# Patient Record
Sex: FEMALE | Race: WHITE | Employment: OTHER | URBAN - METROPOLITAN AREA
[De-identification: names, ages, dates, MRNs, and addresses within clinical notes are randomized per-mention and may not be internally consistent; named-entity substitution may affect disease eponyms.]

---

## 2017-01-11 ENCOUNTER — HOSPITAL ENCOUNTER (OUTPATIENT)
Dept: MAMMOGRAPHY | Age: 77
Discharge: HOME | End: 2017-01-11
Attending: INTERNAL MEDICINE
Payer: MEDICARE

## 2017-01-11 DIAGNOSIS — Z12.31 VISIT FOR SCREENING MAMMOGRAM: ICD-10-CM

## 2017-01-11 PROCEDURE — 77063 BREAST TOMOSYNTHESIS BI: CPT

## 2017-01-25 ENCOUNTER — HOSPITAL ENCOUNTER (OUTPATIENT)
Dept: MAMMOGRAPHY | Age: 77
Discharge: HOME | End: 2017-01-25
Attending: INTERNAL MEDICINE
Payer: MEDICARE

## 2017-01-25 ENCOUNTER — HOSPITAL ENCOUNTER (OUTPATIENT)
Dept: ULTRASOUND IMAGING | Age: 77
Discharge: HOME | End: 2017-01-25
Attending: INTERNAL MEDICINE
Payer: MEDICARE

## 2017-01-25 DIAGNOSIS — R92.8 ABNORMAL MAMMOGRAM: ICD-10-CM

## 2017-01-25 DIAGNOSIS — N64.89 BREAST ASYMMETRY: ICD-10-CM

## 2017-01-25 PROCEDURE — 77065 DX MAMMO INCL CAD UNI: CPT

## 2017-01-25 PROCEDURE — 76642 ULTRASOUND BREAST LIMITED: CPT

## 2017-01-31 ENCOUNTER — HOSPITAL ENCOUNTER (OUTPATIENT)
Dept: MAMMOGRAPHY | Age: 77
Discharge: HOME | End: 2017-01-31
Attending: INTERNAL MEDICINE
Payer: MEDICARE

## 2017-01-31 DIAGNOSIS — N64.89 BREAST ASYMMETRY: ICD-10-CM

## 2017-01-31 DIAGNOSIS — R92.8 ABNORMAL MAMMOGRAM: ICD-10-CM

## 2017-01-31 PROCEDURE — 88374 M/PHMTRC ALYS ISHQUANT/SEMIQ: CPT | Performed by: INTERNAL MEDICINE

## 2017-01-31 PROCEDURE — 88305 TISSUE EXAM BY PATHOLOGIST: CPT | Performed by: INTERNAL MEDICINE

## 2017-01-31 PROCEDURE — 88360 TUMOR IMMUNOHISTOCHEM/MANUAL: CPT | Performed by: INTERNAL MEDICINE

## 2017-01-31 PROCEDURE — 77065 DX MAMMO INCL CAD UNI: CPT

## 2017-01-31 PROCEDURE — 74011000250 HC RX REV CODE- 250: Performed by: INTERNAL MEDICINE

## 2017-01-31 PROCEDURE — 19081 BX BREAST 1ST LESION STRTCTC: CPT

## 2017-01-31 RX ORDER — LIDOCAINE HYDROCHLORIDE AND EPINEPHRINE 10; 10 MG/ML; UG/ML
1.5 INJECTION, SOLUTION INFILTRATION; PERINEURAL
Status: COMPLETED | OUTPATIENT
Start: 2017-01-31 | End: 2017-01-31

## 2017-01-31 RX ORDER — LIDOCAINE HYDROCHLORIDE 10 MG/ML
5 INJECTION, SOLUTION EPIDURAL; INFILTRATION; INTRACAUDAL; PERINEURAL
Status: COMPLETED | OUTPATIENT
Start: 2017-01-31 | End: 2017-01-31

## 2017-01-31 RX ADMIN — LIDOCAINE HYDROCHLORIDE AND EPINEPHRINE 30 MG: 10; 10 INJECTION, SOLUTION INFILTRATION; PERINEURAL at 08:45

## 2017-01-31 RX ADMIN — LIDOCAINE HYDROCHLORIDE 3 ML: 10 INJECTION, SOLUTION EPIDURAL; INFILTRATION; INTRACAUDAL; PERINEURAL at 08:45

## 2017-02-06 ENCOUNTER — OFFICE VISIT (OUTPATIENT)
Dept: SURGERY | Age: 77
End: 2017-02-06

## 2017-02-06 VITALS — WEIGHT: 112 LBS | RESPIRATION RATE: 20 BRPM | BODY MASS INDEX: 21.99 KG/M2 | HEIGHT: 60 IN

## 2017-02-06 DIAGNOSIS — Z01.818 PRE-OP TESTING: Primary | ICD-10-CM

## 2017-02-06 DIAGNOSIS — C50.012 MALIGNANT NEOPLASM OF NIPPLE OF LEFT BREAST IN FEMALE (HCC): ICD-10-CM

## 2017-02-06 PROBLEM — C50.412 BREAST CANCER OF UPPER-OUTER QUADRANT OF LEFT FEMALE BREAST (HCC): Status: ACTIVE | Noted: 2017-02-06

## 2017-02-06 RX ORDER — LEVOTHYROXINE SODIUM 25 UG/1
TABLET ORAL
Refills: 2 | COMMUNITY
Start: 2016-11-23

## 2017-02-06 RX ORDER — ALPRAZOLAM 0.5 MG/1
TABLET ORAL
Refills: 0 | COMMUNITY
Start: 2017-01-20

## 2017-02-06 RX ORDER — ATORVASTATIN CALCIUM 40 MG/1
TABLET, FILM COATED ORAL
Refills: 2 | COMMUNITY
Start: 2017-01-08

## 2017-02-06 RX ORDER — CLOPIDOGREL BISULFATE 75 MG/1
TABLET ORAL
Refills: 2 | COMMUNITY
Start: 2017-01-30

## 2017-02-06 RX ORDER — AMLODIPINE BESYLATE 2.5 MG/1
TABLET ORAL
Refills: 3 | Status: ON HOLD | COMMUNITY
Start: 2016-12-01 | End: 2018-11-08

## 2017-02-06 NOTE — PROGRESS NOTES
Progress Note    Patient: Mary Lima  MRN: [id number]  SSN: [id number]      1   YOB: 1940  Age: 78 y.o. Sex: female     Chief Complaint   Patient presents with    Breast Cancer     left breast cancer; on Plavix for about 7yr after TIA        HPI    Ms. Roger Luke is a 72-year-old woman who presents after a biopsy by radiology shows infiltrating ductal carcinoma in her left breast.  She has a 6.6 mm lesion in the upper outer quadrant of her left breast detected by screening mammography. She has some distant cousins that have had breast cancer but no primary relatives. Her menarche was 17 and her first child in her early 25s. She has no other risk factors for breast cancer. This is an asymptomatic lesion. No past medical history on file. Past Surgical History   Procedure Laterality Date    Hx hysterectomy       No Known Allergies  Current Outpatient Prescriptions   Medication Sig Dispense Refill    ALPRAZolam (XANAX) 0.5 mg tablet TK 1 T PO ONCE A DAY FOR 30 DAYS  0    amLODIPine (NORVASC) 2.5 mg tablet TK 1 T PO  ONCE A DAY  3    atorvastatin (LIPITOR) 40 mg tablet TK 1 T PO  ONCE A DAY  2    levothyroxine (SYNTHROID) 25 mcg tablet TK 1 T PO QD IN THE MORNING OES  2    clopidogrel (PLAVIX) 75 mg tab TK 1 T PO QD  2     Social History     Social History    Marital status:      Spouse name: N/A    Number of children: N/A    Years of education: N/A     Occupational History    Not on file.      Social History Main Topics    Smoking status: Not on file    Smokeless tobacco: Not on file    Alcohol use Not on file    Drug use: Not on file    Sexual activity: Not on file     Other Topics Concern    Not on file     Social History Narrative    No narrative on file     Family History   Problem Relation Age of Onset    Cancer Mother 39     Throat         Review of systems:  Patient denies any reflux, emesis, abdominal pain, change in bowel habits, hematochezia, melena, fever, weight loss, fatigue chills, dermatitis, abnormal moles, change in vision, vertigo, epistaxis, dysphagia, hoarseness, chest pain, palpitations, hypertension, edema, cough, shortness of breath, wheezing, hemoptysis, snoring, hematuria, diabetes, thyroid disease, anemia, bruising, history of blood transfusion, dizziness, headache, or fainting. Physical Examination    Well developed well nourished female in no apparent distress  Visit Vitals    Resp 20    Ht 4' 11.5\" (1.511 m)    Wt 50.8 kg (112 lb)    BMI 22.24 kg/m2      Head: normocephalic, atraumatic  Mouth: Clear, no overt lesions, oral mucosa pink and moist  Neck: supple, no masses, no adenopathy or carotid bruits, trachea midline  Resp: clear to auscultation bilaterally, no wheeze, rhonchi or rales, excursions normal and symmetrical  Cardio: Regular rate and rhythm, no murmurs, clicks, gallops or rubs, no edema or varicosities  Abdomen: soft, nontender, nondistended, normoactive bowel sounds, no hernias, no hepatosplenomegaly,   Back: Deferred  Extremeties: warm, well-perfused, no tenderness or swelling, normal gait/station  Neuro: sensation and strength grossly intact and symmetrical  Psych: alert and oriented to person, place and time  Breast exam bilateral breast exam without dominant mass, skin change, nipple discharge, or lymphadenopathy. Contused left breast at biopsy site. IMPRESSION  Left breast cancer. We have talked in detail for greater than an hour about the treatment of breast cancer to include surgical options radiation, chemotherapy, and hormone therapy. We reviewed data from the NCCN guidelines as well as survival rates for both breast conservation and mastectomy. We have discussed sentinel lymph node biopsy as well. She is decided for a left mastectomy with sentinel lymph node biopsy.     PLAN  Orders Placed This Encounter    NM LYMPHOSCINTIG LT BREAST     Standing Status:   Future     Standing Expiration Date:   3/6/2018 Scheduling Instructions:      Per Dilcia in nuc med, schedule for 7:30a     Order Specific Question:   Reason for Exam     Answer:   left breast cancer    NM LYMPHOSCINTIG LT BREAST     Standing Status:   Future     Standing Expiration Date:   3/6/2018     Scheduling Instructions:      Per Dilcia in nuc med, please schedule for 7:30a     Order Specific Question:   Reason for Exam     Answer:   left breast cancer    ALPRAZolam (XANAX) 0.5 mg tablet     Sig: TK 1 T PO ONCE A DAY FOR 30 DAYS     Refill:  0    amLODIPine (NORVASC) 2.5 mg tablet     Sig: TK 1 T PO  ONCE A DAY     Refill:  3    atorvastatin (LIPITOR) 40 mg tablet     Sig: TK 1 T PO  ONCE A DAY     Refill:  2    clopidogrel (PLAVIX) 75 mg tab     Sig: TK 1 T PO QD     Refill:  2    levothyroxine (SYNTHROID) 25 mcg tablet     Sig: TK 1 T PO QD IN THE MORNING OES     Refill:  2     Left total mastectomy with sentinel lymph node biopsy after Plavix has been held for 2 weeks.   David Jaquez MD

## 2017-02-06 NOTE — MR AVS SNAPSHOT
Visit Information Date & Time Provider Department Dept. Phone Encounter #  
 2/6/2017  9:30 AM MD [facility] [phone] [id number] Upcoming Health Maintenance Date Due DTaP/Tdap/Td series (1 - Tdap) 7/20/1963 FOBT Q 1 YEAR AGE 50-75 7/20/1992 ZOSTER VACCINE AGE 60> 7/20/2002 GLAUCOMA SCREENING Q2Y 7/20/2007 OSTEOPOROSIS SCREENING (DEXA) 7/20/2007 Pneumococcal 65+ Low/Medium Risk (1 of 2 - PCV13) 7/20/2007 MEDICARE YEARLY EXAM 7/20/2007 INFLUENZA AGE 9 TO ADULT 8/1/2016 Allergies as of 2/6/2017  Review Complete On: 2/6/2017 By: Krista Man LPN No Known Allergies Current Immunizations  Never Reviewed No immunizations on file. Not reviewed this visit Vitals Resp Height(growth percentile) Weight(growth percentile) BMI  
  
 20 4' 11.5\" (1.511 m) 112 lb (50.8 kg) 22.24 kg/m2 BMI and BSA Data Body Mass Index Body Surface Area  
 22.24 kg/m 2 1.46 m 2 Your Updated Medication List  
  
   
This list is accurate as of: 2/6/17 10:20 AM.  Always use your most recent med list.  
  
  
  
  
 ALPRAZolam 0.5 mg tablet Commonly known as:  Sharyon Kida TK 1 T PO ONCE A DAY FOR 30 DAYS  
  
 amLODIPine 2.5 mg tablet Commonly known as:  Brandy Fraction TK 1 T PO  ONCE A DAY  
  
 atorvastatin 40 mg tablet Commonly known as:  LIPITOR TK 1 T PO  ONCE A DAY  
  
 clopidogrel 75 mg Tab Commonly known as:  PLAVIX TK 1 T PO QD  
  
 levothyroxine 25 mcg tablet Commonly known as:  SYNTHROID TK 1 T PO QD IN THE MORNING OES Patient Instructions Call the office at [phone] if you have any questions or concerns. Introducing [facility] & HEALTH SERVICES! Karlos Hernandez introduces Picturk patient portal. Now you can access parts of your medical record, email your doctor's office, and request medication refills online.    
 
1. In your internet browser, go to [email or web address] 2. Click on the First Time User? Click Here link in the Sign In box. You will see the New Member Sign Up page. 3. Enter your [employer] Access Code exactly as it appears below. You will not need to use this code after youve completed the sign-up process. If you do not sign up before the expiration date, you must request a new code. · [employer] Access Code: [email or web address] Expires: 4/3/2017  3:23 PM 
 
4. Enter the last four digits of your Social Security Number (xxxx) and Date of Birth (mm/dd/yyyy) as indicated and click Submit. You will be taken to the next sign-up page. 5. Create a Popular Payst ID. This will be your [employer] login ID and cannot be changed, so think of one that is secure and easy to remember. 6. Create a [employer] password. You can change your password at any time. 7. Enter your Password Reset Question and Answer. This can be used at a later time if you forget your password. 8. Enter your e-mail address. You will receive e-mail notification when new information is available in [address] 9. Click Sign Up. You can now view and download portions of your medical record. 10. Click the Download Summary menu link to download a portable copy of your medical information. If you have questions, please visit the Frequently Asked Questions section of the [employer] website. Remember, [employer] is NOT to be used for urgent needs. For medical emergencies, dial 911. Now available from your iPhone and Android! Please provide this summary of care documentation to your next provider. Your primary care clinician is listed as Symone Grijalva. If you have any questions after today's visit, please call [phone].

## 2017-02-08 ENCOUNTER — HOSPITAL ENCOUNTER (OUTPATIENT)
Dept: LAB | Age: 77
Discharge: HOME | End: 2017-02-08
Payer: MEDICARE

## 2017-02-08 DIAGNOSIS — Z01.818 PRE-OP TESTING: ICD-10-CM

## 2017-02-08 LAB
ANION GAP BLD CALC-SCNC: 8 MMOL/L (ref 3–18)
ATRIAL RATE: 60 BPM
BASOPHILS # BLD AUTO: 0 K/UL (ref 0–0.1)
BASOPHILS # BLD: 1 % (ref 0–2)
BUN SERPL-MCNC: 21 MG/DL (ref 7–18)
BUN/CREAT SERPL: 26 (ref 12–20)
CALCIUM SERPL-MCNC: 9.6 MG/DL (ref 8.5–10.1)
CALCULATED P AXIS, ECG09: 15 DEGREES
CALCULATED R AXIS, ECG10: 42 DEGREES
CALCULATED T AXIS, ECG11: 6 DEGREES
CHLORIDE SERPL-SCNC: 102 MMOL/L (ref 100–108)
CO2 SERPL-SCNC: 30 MMOL/L (ref 21–32)
CREAT SERPL-MCNC: 0.82 MG/DL (ref 0.6–1.3)
DIAGNOSIS, 93000: NORMAL
DIFFERENTIAL METHOD BLD: ABNORMAL
EOSINOPHIL # BLD: 0.1 K/UL (ref 0–0.4)
EOSINOPHIL NFR BLD: 3 % (ref 0–5)
ERYTHROCYTE [DISTWIDTH] IN BLOOD BY AUTOMATED COUNT: 12.6 % (ref 11.6–14.5)
GLUCOSE SERPL-MCNC: 105 MG/DL (ref 74–99)
HCT VFR BLD AUTO: 40.5 % (ref 35–45)
HGB BLD-MCNC: 13.7 G/DL (ref 12–16)
LYMPHOCYTES # BLD AUTO: 27 % (ref 21–52)
LYMPHOCYTES # BLD: 1.1 K/UL (ref 0.9–3.6)
MCH RBC QN AUTO: 32.5 PG (ref 24–34)
MCHC RBC AUTO-ENTMCNC: 33.8 G/DL (ref 31–37)
MCV RBC AUTO: 96 FL (ref 74–97)
MONOCYTES # BLD: 0.6 K/UL (ref 0.05–1.2)
MONOCYTES NFR BLD AUTO: 14 % (ref 3–10)
NEUTS SEG # BLD: 2.3 K/UL (ref 1.8–8)
NEUTS SEG NFR BLD AUTO: 55 % (ref 40–73)
P-R INTERVAL, ECG05: 158 MS
PLATELET # BLD AUTO: 242 K/UL (ref 135–420)
PMV BLD AUTO: 9.5 FL (ref 9.2–11.8)
POTASSIUM SERPL-SCNC: 4 MMOL/L (ref 3.5–5.5)
Q-T INTERVAL, ECG07: 434 MS
QRS DURATION, ECG06: 80 MS
QTC CALCULATION (BEZET), ECG08: 434 MS
RBC # BLD AUTO: 4.22 M/UL (ref 4.2–5.3)
SODIUM SERPL-SCNC: 140 MMOL/L (ref 136–145)
VENTRICULAR RATE, ECG03: 60 BPM
WBC # BLD AUTO: 4.1 K/UL (ref 4.6–13.2)

## 2017-02-08 PROCEDURE — 80048 BASIC METABOLIC PNL TOTAL CA: CPT

## 2017-02-08 PROCEDURE — 85025 COMPLETE CBC W/AUTO DIFF WBC: CPT

## 2017-02-08 PROCEDURE — 93005 ELECTROCARDIOGRAM TRACING: CPT

## 2017-02-08 RX ORDER — ASPIRIN 81 MG/1
81 TABLET ORAL DAILY
COMMUNITY

## 2017-02-20 ENCOUNTER — ANESTHESIA EVENT (OUTPATIENT)
Dept: SURGERY | Age: 77
End: 2017-02-20
Payer: MEDICARE

## 2017-02-21 ENCOUNTER — HOSPITAL ENCOUNTER (OUTPATIENT)
Dept: NUCLEAR MEDICINE | Age: 77
Discharge: HOME | End: 2017-02-21
Payer: MEDICARE

## 2017-02-21 ENCOUNTER — ANESTHESIA (OUTPATIENT)
Dept: SURGERY | Age: 77
End: 2017-02-21
Payer: MEDICARE

## 2017-02-21 ENCOUNTER — SURGERY (OUTPATIENT)
Age: 77
End: 2017-02-21

## 2017-02-21 ENCOUNTER — HOSPITAL ENCOUNTER (OUTPATIENT)
Age: 77
Setting detail: OBSERVATION
LOS: 1 days | Discharge: HOME | End: 2017-02-22
Payer: MEDICARE

## 2017-02-21 DIAGNOSIS — C50.412 BREAST CANCER OF UPPER-OUTER QUADRANT OF LEFT FEMALE BREAST (HCC): Primary | ICD-10-CM

## 2017-02-21 DIAGNOSIS — C50.012 MALIGNANT NEOPLASM OF NIPPLE OF LEFT BREAST IN FEMALE (HCC): ICD-10-CM

## 2017-02-21 LAB
BASOPHILS # BLD AUTO: 0 K/UL (ref 0–0.1)
BASOPHILS # BLD: 0 % (ref 0–2)
DIFFERENTIAL METHOD BLD: ABNORMAL
EOSINOPHIL # BLD: 0 K/UL (ref 0–0.4)
EOSINOPHIL NFR BLD: 0 % (ref 0–5)
ERYTHROCYTE [DISTWIDTH] IN BLOOD BY AUTOMATED COUNT: 12.5 % (ref 11.6–14.5)
HCT VFR BLD AUTO: 40.4 % (ref 35–45)
HGB BLD-MCNC: 13.8 G/DL (ref 12–16)
LYMPHOCYTES # BLD AUTO: 4 % (ref 21–52)
LYMPHOCYTES # BLD: 0.4 K/UL (ref 0.9–3.6)
MCH RBC QN AUTO: 32.8 PG (ref 24–34)
MCHC RBC AUTO-ENTMCNC: 34.2 G/DL (ref 31–37)
MCV RBC AUTO: 96 FL (ref 74–97)
MONOCYTES # BLD: 0.1 K/UL (ref 0.05–1.2)
MONOCYTES NFR BLD AUTO: 1 % (ref 3–10)
NEUTS SEG # BLD: 10.1 K/UL (ref 1.8–8)
NEUTS SEG NFR BLD AUTO: 95 % (ref 40–73)
PLATELET # BLD AUTO: 225 K/UL (ref 135–420)
PMV BLD AUTO: 9.4 FL (ref 9.2–11.8)
RBC # BLD AUTO: 4.21 M/UL (ref 4.2–5.3)
WBC # BLD AUTO: 10.6 K/UL (ref 4.6–13.2)

## 2017-02-21 PROCEDURE — 78195 LYMPH SYSTEM IMAGING: CPT

## 2017-02-21 PROCEDURE — 77010033678 HC OXYGEN DAILY

## 2017-02-21 PROCEDURE — 77030031139 HC SUT VCRL2 J&J -A

## 2017-02-21 PROCEDURE — 77030002933 HC SUT MCRYL J&J -A

## 2017-02-21 PROCEDURE — 77030012012 HC AIRWY OP SFT TELE -A: Performed by: NURSE ANESTHETIST, CERTIFIED REGISTERED

## 2017-02-21 PROCEDURE — 76010000153 HC OR TIME 1.5 TO 2 HR

## 2017-02-21 PROCEDURE — 88309 TISSUE EXAM BY PATHOLOGIST: CPT

## 2017-02-21 PROCEDURE — 77030012406 HC DRN WND PENRS BARD -A

## 2017-02-21 PROCEDURE — 77030011640 HC PAD GRND REM COVD -A

## 2017-02-21 PROCEDURE — 74011250636 HC RX REV CODE- 250/636

## 2017-02-21 PROCEDURE — 74011250637 HC RX REV CODE- 250/637: Performed by: NURSE ANESTHETIST, CERTIFIED REGISTERED

## 2017-02-21 PROCEDURE — 74011636320 HC RX REV CODE- 636/320

## 2017-02-21 PROCEDURE — 77030010512 HC APPL CLP LIG J&J -C

## 2017-02-21 PROCEDURE — 77030020782 HC GWN BAIR PAWS FLX 3M -B

## 2017-02-21 PROCEDURE — 77030010509 HC AIRWY LMA MSK TELE -A: Performed by: NURSE ANESTHETIST, CERTIFIED REGISTERED

## 2017-02-21 PROCEDURE — 77030002996 HC SUT SLK J&J -A

## 2017-02-21 PROCEDURE — 77030013567 HC DRN WND RESERV BARD -A

## 2017-02-21 PROCEDURE — 36415 COLL VENOUS BLD VENIPUNCTURE: CPT

## 2017-02-21 PROCEDURE — 76060000034 HC ANESTHESIA 1.5 TO 2 HR

## 2017-02-21 PROCEDURE — 74011000250 HC RX REV CODE- 250

## 2017-02-21 PROCEDURE — 77030002916 HC SUT ETHLN J&J -A

## 2017-02-21 PROCEDURE — 76210000006 HC OR PH I REC 0.5 TO 1 HR

## 2017-02-21 PROCEDURE — 88307 TISSUE EXAM BY PATHOLOGIST: CPT

## 2017-02-21 PROCEDURE — 77030008467 HC STPLR SKN COVD -B

## 2017-02-21 PROCEDURE — 77030011265 HC ELECTRD BLD HEX COVD -A

## 2017-02-21 PROCEDURE — 85025 COMPLETE CBC W/AUTO DIFF WBC: CPT

## 2017-02-21 PROCEDURE — 74011250636 HC RX REV CODE- 250/636: Performed by: NURSE ANESTHETIST, CERTIFIED REGISTERED

## 2017-02-21 PROCEDURE — 99218 HC RM OBSERVATION: CPT

## 2017-02-21 PROCEDURE — 88342 IMHCHEM/IMCYTCHM 1ST ANTB: CPT

## 2017-02-21 RX ORDER — HYDROMORPHONE HYDROCHLORIDE 1 MG/ML
INJECTION, SOLUTION INTRAMUSCULAR; INTRAVENOUS; SUBCUTANEOUS AS NEEDED
Status: DISCONTINUED | OUTPATIENT
Start: 2017-02-21 | End: 2017-02-21 | Stop reason: HOSPADM

## 2017-02-21 RX ORDER — CEFAZOLIN SODIUM 2 G/50ML
2 SOLUTION INTRAVENOUS ONCE
Status: COMPLETED | OUTPATIENT
Start: 2017-02-21 | End: 2017-02-21

## 2017-02-21 RX ORDER — LIDOCAINE HYDROCHLORIDE 10 MG/ML
INJECTION, SOLUTION EPIDURAL; INFILTRATION; INTRACAUDAL; PERINEURAL
Status: COMPLETED
Start: 2017-02-21 | End: 2017-02-21

## 2017-02-21 RX ORDER — ISOSULFAN BLUE 50 MG/5ML
INJECTION, SOLUTION SUBCUTANEOUS AS NEEDED
Status: DISCONTINUED | OUTPATIENT
Start: 2017-02-21 | End: 2017-02-21 | Stop reason: HOSPADM

## 2017-02-21 RX ORDER — EPHEDRINE SULFATE/0.9% NACL/PF 25 MG/5 ML
SYRINGE (ML) INTRAVENOUS AS NEEDED
Status: DISCONTINUED | OUTPATIENT
Start: 2017-02-21 | End: 2017-02-21 | Stop reason: HOSPADM

## 2017-02-21 RX ORDER — LIDOCAINE HYDROCHLORIDE 20 MG/ML
INJECTION, SOLUTION EPIDURAL; INFILTRATION; INTRACAUDAL; PERINEURAL AS NEEDED
Status: DISCONTINUED | OUTPATIENT
Start: 2017-02-21 | End: 2017-02-21 | Stop reason: HOSPADM

## 2017-02-21 RX ORDER — PROPOFOL 10 MG/ML
INJECTION, EMULSION INTRAVENOUS AS NEEDED
Status: DISCONTINUED | OUTPATIENT
Start: 2017-02-21 | End: 2017-02-21 | Stop reason: HOSPADM

## 2017-02-21 RX ORDER — SODIUM BICARBONATE 1 MEQ/ML
SYRINGE (ML) INTRAVENOUS
Status: DISPENSED
Start: 2017-02-21 | End: 2017-02-21

## 2017-02-21 RX ORDER — DIPHENHYDRAMINE HYDROCHLORIDE 50 MG/ML
12.5 INJECTION, SOLUTION INTRAMUSCULAR; INTRAVENOUS
Status: DISCONTINUED | OUTPATIENT
Start: 2017-02-21 | End: 2017-02-22 | Stop reason: HOSPADM

## 2017-02-21 RX ORDER — ESMOLOL HYDROCHLORIDE 10 MG/ML
INJECTION INTRAVENOUS AS NEEDED
Status: DISCONTINUED | OUTPATIENT
Start: 2017-02-21 | End: 2017-02-21 | Stop reason: HOSPADM

## 2017-02-21 RX ORDER — SODIUM CHLORIDE, SODIUM LACTATE, POTASSIUM CHLORIDE, CALCIUM CHLORIDE 600; 310; 30; 20 MG/100ML; MG/100ML; MG/100ML; MG/100ML
75 INJECTION, SOLUTION INTRAVENOUS CONTINUOUS
Status: DISCONTINUED | OUTPATIENT
Start: 2017-02-21 | End: 2017-02-21 | Stop reason: HOSPADM

## 2017-02-21 RX ORDER — FAMOTIDINE 20 MG/1
20 TABLET, FILM COATED ORAL ONCE
Status: COMPLETED | OUTPATIENT
Start: 2017-02-21 | End: 2017-02-21

## 2017-02-21 RX ORDER — DEXAMETHASONE SODIUM PHOSPHATE 4 MG/ML
INJECTION, SOLUTION INTRA-ARTICULAR; INTRALESIONAL; INTRAMUSCULAR; INTRAVENOUS; SOFT TISSUE AS NEEDED
Status: DISCONTINUED | OUTPATIENT
Start: 2017-02-21 | End: 2017-02-21 | Stop reason: HOSPADM

## 2017-02-21 RX ORDER — MORPHINE SULFATE 2 MG/ML
2 INJECTION, SOLUTION INTRAMUSCULAR; INTRAVENOUS
Status: DISCONTINUED | OUTPATIENT
Start: 2017-02-21 | End: 2017-02-21 | Stop reason: HOSPADM

## 2017-02-21 RX ORDER — FENTANYL CITRATE 50 UG/ML
INJECTION, SOLUTION INTRAMUSCULAR; INTRAVENOUS AS NEEDED
Status: DISCONTINUED | OUTPATIENT
Start: 2017-02-21 | End: 2017-02-21 | Stop reason: HOSPADM

## 2017-02-21 RX ORDER — DEXTROSE, SODIUM CHLORIDE, AND POTASSIUM CHLORIDE 5; .45; .15 G/100ML; G/100ML; G/100ML
75 INJECTION INTRAVENOUS CONTINUOUS
Status: DISCONTINUED | OUTPATIENT
Start: 2017-02-21 | End: 2017-02-22 | Stop reason: HOSPADM

## 2017-02-21 RX ORDER — ONDANSETRON 2 MG/ML
INJECTION INTRAMUSCULAR; INTRAVENOUS AS NEEDED
Status: DISCONTINUED | OUTPATIENT
Start: 2017-02-21 | End: 2017-02-21 | Stop reason: HOSPADM

## 2017-02-21 RX ORDER — SODIUM CHLORIDE, SODIUM LACTATE, POTASSIUM CHLORIDE, CALCIUM CHLORIDE 600; 310; 30; 20 MG/100ML; MG/100ML; MG/100ML; MG/100ML
50 INJECTION, SOLUTION INTRAVENOUS CONTINUOUS
Status: DISCONTINUED | OUTPATIENT
Start: 2017-02-21 | End: 2017-02-21 | Stop reason: HOSPADM

## 2017-02-21 RX ORDER — NALOXONE HYDROCHLORIDE 0.4 MG/ML
0.1 INJECTION, SOLUTION INTRAMUSCULAR; INTRAVENOUS; SUBCUTANEOUS AS NEEDED
Status: DISCONTINUED | OUTPATIENT
Start: 2017-02-21 | End: 2017-02-21 | Stop reason: HOSPADM

## 2017-02-21 RX ORDER — MIDAZOLAM HYDROCHLORIDE 1 MG/ML
INJECTION, SOLUTION INTRAMUSCULAR; INTRAVENOUS AS NEEDED
Status: DISCONTINUED | OUTPATIENT
Start: 2017-02-21 | End: 2017-02-21 | Stop reason: HOSPADM

## 2017-02-21 RX ADMIN — FENTANYL CITRATE 50 MCG: 50 INJECTION, SOLUTION INTRAMUSCULAR; INTRAVENOUS at 11:16

## 2017-02-21 RX ADMIN — ONDANSETRON 4 MG: 2 INJECTION INTRAMUSCULAR; INTRAVENOUS at 12:12

## 2017-02-21 RX ADMIN — FENTANYL CITRATE 50 MCG: 50 INJECTION, SOLUTION INTRAMUSCULAR; INTRAVENOUS at 12:04

## 2017-02-21 RX ADMIN — CEFAZOLIN SODIUM 2 G: 2 SOLUTION INTRAVENOUS at 11:04

## 2017-02-21 RX ADMIN — HYDROMORPHONE HYDROCHLORIDE 0.25 MG: 1 INJECTION, SOLUTION INTRAMUSCULAR; INTRAVENOUS; SUBCUTANEOUS at 12:27

## 2017-02-21 RX ADMIN — DEXTROSE MONOHYDRATE, SODIUM CHLORIDE, AND POTASSIUM CHLORIDE 75 ML/HR: 50; 4.5; 1.49 INJECTION, SOLUTION INTRAVENOUS at 18:08

## 2017-02-21 RX ADMIN — HYDROMORPHONE HYDROCHLORIDE 0.25 MG: 1 INJECTION, SOLUTION INTRAMUSCULAR; INTRAVENOUS; SUBCUTANEOUS at 12:40

## 2017-02-21 RX ADMIN — HYDROMORPHONE HYDROCHLORIDE 0.25 MG: 1 INJECTION, SOLUTION INTRAMUSCULAR; INTRAVENOUS; SUBCUTANEOUS at 12:23

## 2017-02-21 RX ADMIN — ESMOLOL HYDROCHLORIDE 30 MG: 10 INJECTION INTRAVENOUS at 11:57

## 2017-02-21 RX ADMIN — LIDOCAINE HYDROCHLORIDE 40 MG: 20 INJECTION, SOLUTION EPIDURAL; INFILTRATION; INTRACAUDAL; PERINEURAL at 11:08

## 2017-02-21 RX ADMIN — MIDAZOLAM HYDROCHLORIDE 2 MG: 1 INJECTION, SOLUTION INTRAMUSCULAR; INTRAVENOUS at 11:04

## 2017-02-21 RX ADMIN — LIDOCAINE HYDROCHLORIDE: 10 INJECTION, SOLUTION EPIDURAL; INFILTRATION; INTRACAUDAL; PERINEURAL at 08:56

## 2017-02-21 RX ADMIN — PROPOFOL 160 MG: 10 INJECTION, EMULSION INTRAVENOUS at 11:08

## 2017-02-21 RX ADMIN — LIDOCAINE HYDROCHLORIDE: 10 INJECTION, SOLUTION EPIDURAL; INFILTRATION; INTRACAUDAL; PERINEURAL at 08:57

## 2017-02-21 RX ADMIN — DEXAMETHASONE SODIUM PHOSPHATE 4 MG: 4 INJECTION, SOLUTION INTRA-ARTICULAR; INTRALESIONAL; INTRAMUSCULAR; INTRAVENOUS; SOFT TISSUE at 11:12

## 2017-02-21 RX ADMIN — FENTANYL CITRATE 50 MCG: 50 INJECTION, SOLUTION INTRAMUSCULAR; INTRAVENOUS at 11:37

## 2017-02-21 RX ADMIN — SODIUM CHLORIDE, SODIUM LACTATE, POTASSIUM CHLORIDE, AND CALCIUM CHLORIDE 50 ML/HR: 600; 310; 30; 20 INJECTION, SOLUTION INTRAVENOUS at 10:58

## 2017-02-21 RX ADMIN — FAMOTIDINE 20 MG: 20 TABLET ORAL at 07:05

## 2017-02-21 RX ADMIN — ISOSULFAN BLUE 3 ML: 10 INJECTION, SOLUTION SUBCUTANEOUS at 11:34

## 2017-02-21 RX ADMIN — Medication 5 MG: at 11:29

## 2017-02-21 RX ADMIN — HYDROMORPHONE HYDROCHLORIDE 0.25 MG: 1 INJECTION, SOLUTION INTRAMUSCULAR; INTRAVENOUS; SUBCUTANEOUS at 12:37

## 2017-02-21 RX ADMIN — FENTANYL CITRATE 50 MCG: 50 INJECTION, SOLUTION INTRAMUSCULAR; INTRAVENOUS at 11:52

## 2017-02-21 NOTE — ANESTHESIA POSTPROCEDURE EVALUATION
Post-Anesthesia Evaluation and Assessment    Patient: Maria Ines Garcia MRN: [id number]  SSN: [id number]    YOB: 1940  Age: 78 y.o. Sex: female       Cardiovascular Function/Vital Signs  Visit Vitals    /59    Pulse 99    Temp 36.4 °C (97.5 °F)    Resp 12    Ht 4' 11.5\" (1.511 m)    Wt 52.6 kg (116 lb)    SpO2 100%    BMI 23.04 kg/m2       Patient is status post general anesthesia for Procedure(s):  LEFT TOTAL MASTECTOMY/SENTINEL LYMPH NODE BIOPSY. Nausea/Vomiting: None    Postoperative hydration reviewed and adequate. Pain:  Pain Scale 1: Numeric (0 - 10) (02/21/17 1258)  Pain Intensity 1: 0 (02/21/17 1258)   Managed    Neurological Status:   Neuro (WDL): Within Defined Limits (02/21/17 1253)   At baseline    Mental Status and Level of Consciousness: Arousable    Pulmonary Status:   O2 Device: Oxygen mask (02/21/17 1258)   Adequate oxygenation and airway patent    Complications related to anesthesia: None    Post-anesthesia assessment completed.  No concerns    Signed By: Landry Mcdermott MD     February 21, 2017

## 2017-02-21 NOTE — PERIOP NOTES
TRANSFER - OUT REPORT:    Verbal report given to Geoff Bailey RN on [employer]  being transferred to  for routine post - op       Report consisted of patients Situation, Background, Assessment and   Recommendations(SBAR). Information from the following report(s) SBAR and MAR was reviewed with the receiving nurse. Lines:   Peripheral IV 02/21/17 Right Arm (Active)   Site Assessment Clean, dry, & intact 2/21/2017 12:53 PM   Phlebitis Assessment 0 2/21/2017 12:53 PM   Infiltration Assessment 0 2/21/2017 12:53 PM   Dressing Status Clean, dry, & intact 2/21/2017 12:53 PM   Dressing Type Transparent;Tape 2/21/2017 12:53 PM   Hub Color/Line Status Infusing;Pink 2/21/2017 12:53 PM        Opportunity for questions and clarification was provided.       Patient transported with:   O2 @ 3 liters

## 2017-02-21 NOTE — ANESTHESIA PREPROCEDURE EVALUATION
Anesthetic History   No history of anesthetic complications            Review of Systems / Medical History  Patient summary reviewed    Pulmonary  Within defined limits                 Neuro/Psych         TIA     Cardiovascular    Hypertension              Exercise tolerance: >4 METS     GI/Hepatic/Renal                Endo/Other      Hypothyroidism  Cancer (Left breast cancer)     Other Findings   Comments: Current Smoker? NO       Elective Surgery? Yes       Abstained from smoking 24 hours prior to anesthesia? N/A    Risk Factors for Postoperative nausea/vomiting:       History of postoperative nausea/vomiting? NO       Female? YES       Motion sickness? NO       Intended opioid administration for postoperative analgesia?   YES           Physical Exam    Airway  Mallampati: II  TM Distance: 4 - 6 cm  Neck ROM: normal range of motion   Mouth opening: Normal     Cardiovascular  Regular rate and rhythm,  S1 and S2 normal,  no murmur, click, rub, or gallop             Dental  No notable dental hx       Pulmonary  Breath sounds clear to auscultation               Abdominal  GI exam deferred       Other Findings            Anesthetic Plan    ASA: 3  Anesthesia type: general          Induction: Intravenous  Anesthetic plan and risks discussed with: Patient

## 2017-02-21 NOTE — PROGRESS NOTES
Assisted patient with the execution of Advance Medical Directive. Placed the copy into patient's \"like paper chart. \"  See Flow Sheet for other pastoral interventions. Chaplains will continue to follow and provide pastoral care on an as needed/requested basis.     [facility] Certified [address] [place] [address]   [phone]

## 2017-02-21 NOTE — IP AVS SNAPSHOT
Josemanuel Troncoso 
 
 
 [address] Patient: Jules Vines MRN: [id number] JG You are allergic to the following No active allergies Recent Documentation Height  
  
  
  
  
  
 1.511 m Emergency Contacts Name Discharge Info Relation Home Work Mobile [address] CAREGIVER [3] Spouse [3] [phone] About your hospitalization You were admitted on:  2017 You last received care in the:  [facility] 1978 You were discharged on:  2017 Unit phone number:  [phone] Why you were hospitalized Your primary diagnosis was:  Not on File Providers Seen During Your Hospitalizations Provider Role Specialty Primary office phone Avinash Thomas MD Attending Provider Surgery [phone] Your Primary Care Physician (PCP) Primary Care Physician Office Phone Office Fax [facility] [phone] Follow-up Information Follow up With Details Comments Contact Info Felipe Matthew MD On 3/1/2017 Appointment at 1:30pm [address] [place] [address] [id number] [phone] Avinash Thomas MD On 3/1/2017 Appointment at [address] [address] [phone] Your Appointments 2017  9:00 AM EST Follow Up with Avinash Thomas MD  
[address] ([facility]) [address] [place] [address] [phone] Current Discharge Medication List  
  
START taking these medications Dose & Instructions Dispensing Information Comments Morning Noon Evening Bedtime  
 oxyCODONE-acetaminophen 5-325 mg per tablet Commonly known as:  PERCOCET Your next dose is: Today, Tomorrow Other:  _________ Dose:  1 Tab Take 1 Tab by mouth every six (6) hours as needed for Pain. Max Daily Amount: 4 Tabs. Quantity:  30 Tab Refills:  0 CONTINUE these medications which have NOT CHANGED Dose & Instructions Dispensing Information Comments Morning Noon Evening Bedtime ALPRAZolam 0.5 mg tablet Commonly known as:  Manjit Amend Your next dose is: Today, Tomorrow Other:  _________ TK 1 T PO ONCE A DAY FOR 30 DAYS Refills:  0  
     
   
   
   
  
 amLODIPine 2.5 mg tablet Commonly known as:  Yvette Croon Your next dose is: Today, Tomorrow Other:  _________ TK 1 T PO  ONCE A DAY Refills:  3  
     
   
   
   
  
 aspirin delayed-release 81 mg tablet Your next dose is: Today, Tomorrow Other:  _________ Dose:  81 mg Take 81 mg by mouth daily. Refills:  0  
     
   
   
   
  
 atorvastatin 40 mg tablet Commonly known as:  LIPITOR Your next dose is: Today, Tomorrow Other:  _________ TK 1 T PO  ONCE A DAY Refills:  2  
     
   
   
   
  
 clopidogrel 75 mg Tab Commonly known as:  PLAVIX Your next dose is: Today, Tomorrow Other:  _________ TK 1 T PO QD Refills:  2  
     
   
   
   
  
 levothyroxine 25 mcg tablet Commonly known as:  SYNTHROID Your next dose is: Today, Tomorrow Other:  _________ TK 1 T PO QD IN THE MORNING OES Refills:  2 Where to Get Your Medications Information on where to get these meds will be given to you by the nurse or doctor. ! Ask your nurse or doctor about these medications  
  oxyCODONE-acetaminophen 5-325 mg per tablet Discharge Instructions DISCHARGE SUMMARY from Nurse The following personal items are in your possession at time of discharge: 
 
Dental Appliances: None Visual Aid: Glasses, With patient Home Medications: None Jewelry: None Clothing: Pants, Shirt, Undergarments, Footwear Other Valuables: None PATIENT INSTRUCTIONS: 
 
 
F-face looks uneven A-arms unable to move or move unevenly S-speech slurred or non-existent T-time-call 911 as soon as signs and symptoms begin-DO NOT go Back to bed or wait to see if you get better-TIME IS BRAIN. Warning Signs of HEART ATTACK Call 911 if you have these symptoms: 
? Chest discomfort. Most heart attacks involve discomfort in the center of the chest that lasts more than a few minutes, or that goes away and comes back. It can feel like uncomfortable pressure, squeezing, fullness, or pain. ? Discomfort in other areas of the upper body. Symptoms can include pain or discomfort in one or both arms, the back, neck, jaw, or stomach. ? Shortness of breath with or without chest discomfort. ? Other signs may include breaking out in a cold sweat, nausea, or lightheadedness. Don't wait more than five minutes to call [address]! Fast action can save your life. Calling 911 is almost always the fastest way to get lifesaving treatment. Emergency Medical Services staff can begin treatment when they arrive  up to an hour sooner than if someone gets to the hospital by car. The discharge information has been reviewed with the patient and caregiver. The patient and caregiver verbalized understanding. Discharge medications reviewed with the patient and caregiver and appropriate educational materials and side effects teaching were provided. [employer] Activation Thank you for requesting access to [employer].  Please follow the instructions below to securely access and download your online medical record. Phrazit allows you to send messages to your doctor, view your test results, renew your prescriptions, schedule appointments, and more. How Do I Sign Up? 1. In your internet browser, go to [email or web address] 
2. Click on the First Time User? Click Here link in the Sign In box. You will be redirect to the New Member Sign Up page. 3. Enter your Phrazit Access Code exactly as it appears below. You will not need to use this code after youve completed the sign-up process. If you do not sign up before the expiration date, you must request a new code. Phrazit Access Code: [email or web address] Expires: 4/3/2017  3:23 PM (This is the date your Phrazit access code will ) 4. Enter the last four digits of your Social Security Number (xxxx) and Date of Birth (mm/dd/yyyy) as indicated and click Submit. You will be taken to the next sign-up page. 5. Create a Phrazit ID. This will be your Phrazit login ID and cannot be changed, so think of one that is secure and easy to remember. 6. Create a Phrazit password. You can change your password at any time. 7. Enter your Password Reset Question and Answer. This can be used at a later time if you forget your password. 8. Enter your e-mail address. You will receive e-mail notification when new information is available in [address] 9. Click Sign Up. You can now view and download portions of your medical record. 10. Click the Download Summary menu link to download a portable copy of your medical information. Additional Information If you have questions, please visit the Frequently Asked Questions section of the Phrazit website at [email or web address] Remember, Phrazit is NOT to be used for urgent needs. For medical emergencies, dial 911. Patient armband removed and shredded Discharge Orders Procedure Order Date Status Priority Quantity Spec Type Associated Dx DIET REGULAR 02/22/17 0930 Normal Routine 1  Breast cancer of upper-outer quadrant of left female breast ([facility] [[id number]] Billeo Announcement We are excited to announce that we are making your provider's discharge notes available to you in Billeo. You will see these notes when they are completed and signed by the physician that discharged you from your recent hospital stay. If you have any questions or concerns about any information you see in Billeo, please call the Health Information Department where you were seen or reach out to your Primary Care Provider for more information about your plan of care. Introducing [facility] & HEALTH SERVICES! Sandra Painting introduces Billeo patient portal. Now you can access parts of your medical record, email your doctor's office, and request medication refills online. 1. In your internet browser, go to [email or web address] 2. Click on the First Time User? Click Here link in the Sign In box. You will see the New Member Sign Up page. 3. Enter your Billeo Access Code exactly as it appears below. You will not need to use this code after youve completed the sign-up process. If you do not sign up before the expiration date, you must request a new code. · Billeo Access Code: [email or web address] Expires: 4/3/2017  3:23 PM 
 
4. Enter the last four digits of your Social Security Number (xxxx) and Date of Birth (mm/dd/yyyy) as indicated and click Submit. You will be taken to the next sign-up page. 5. Create a BioTimet ID. This will be your Billeo login ID and cannot be changed, so think of one that is secure and easy to remember. 6. Create a Billeo password. You can change your password at any time. 7. Enter your Password Reset Question and Answer. This can be used at a later time if you forget your password. 8. Enter your e-mail address. You will receive e-mail notification when new information is available in [address] 9. Click Sign Up. You can now view and download portions of your medical record. 10. Click the Download Summary menu link to download a portable copy of your medical information. If you have questions, please visit the Frequently Asked Questions section of the Assembly website. Remember, Assembly is NOT to be used for urgent needs. For medical emergencies, dial 911. Now available from your iPhone and Android! General Information Please provide this summary of care documentation to your next provider. Patient Signature:  ____________________________________________________________ Date:  ____________________________________________________________  
  
[facility] Provider Signature:  ____________________________________________________________ Date:  ____________________________________________________________

## 2017-02-21 NOTE — ACP (ADVANCE CARE PLANNING)
Assisted patient with the execution of Advance Medical Directive. Placed the copy into patient's \"like paper chart. \"  See Flow Sheet for other pastoral interventions. Chaplains will continue to follow and provide pastoral care on an as needed/requested basis.     [facility] Certified [address] [place] [address]   [phone]

## 2017-02-21 NOTE — BRIEF OP NOTE
BRIEF OPERATIVE NOTE    Date of Procedure: 2/21/2017   Preoperative Diagnosis: Malignant neoplasm of left female breast, unspecified site of breast ([facility] [C50.912]  Postoperative Diagnosis: Malignant [facility] of left female breast, [facility] [C50.912]    Procedure(s):  LEFT TOTAL MASTECTOMY/SENTINEL LYMPH NODE BIOPSY  Surgeon(s) and Role:     * Sylwia Vela MD - Primary            Surgical Staff:  Circ-1: Kashif Ndiaye RN  Circ-Relief: Victorino Bradley RN  Scrub Tech-1: Hugh Apportable  Scrub Tech-Relief: Genny Alvarado  Surg Asst-1: Jasmyn Isabel  Surg Asst-Relief: Jaden Santana  Event Time In   Incision Start 1136   Incision Close 1475     Anesthesia: General   Estimated Blood Loss: 0  Specimens:   ID Type Source Tests Collected by Time Destination   1 : left breast Preservative Breast  Sylwia Vela MD 2/21/2017 1149 Pathology   2 : left axilla sentinel lymph node Preservative Lymph Node  Sylwia Vela MD 2/21/2017 1206 Pathology      Findings: 0   Complications: 0  Implants: * No implants in log *

## 2017-02-22 VITALS
DIASTOLIC BLOOD PRESSURE: 67 MMHG | BODY MASS INDEX: 22.78 KG/M2 | RESPIRATION RATE: 18 BRPM | TEMPERATURE: 97 F | SYSTOLIC BLOOD PRESSURE: 117 MMHG | HEART RATE: 86 BPM | HEIGHT: 60 IN | WEIGHT: 116 LBS | OXYGEN SATURATION: 100 %

## 2017-02-22 LAB
ANION GAP BLD CALC-SCNC: 8 MMOL/L (ref 3–18)
BUN SERPL-MCNC: 15 MG/DL (ref 7–18)
BUN/CREAT SERPL: 18 (ref 12–20)
CALCIUM SERPL-MCNC: 8.4 MG/DL (ref 8.5–10.1)
CHLORIDE SERPL-SCNC: 99 MMOL/L (ref 100–108)
CO2 SERPL-SCNC: 28 MMOL/L (ref 21–32)
CREAT SERPL-MCNC: 0.83 MG/DL (ref 0.6–1.3)
GLUCOSE SERPL-MCNC: 165 MG/DL (ref 74–99)
POTASSIUM SERPL-SCNC: 4.2 MMOL/L (ref 3.5–5.5)
SODIUM SERPL-SCNC: 135 MMOL/L (ref 136–145)

## 2017-02-22 PROCEDURE — 36415 COLL VENOUS BLD VENIPUNCTURE: CPT

## 2017-02-22 PROCEDURE — 80048 BASIC METABOLIC PNL TOTAL CA: CPT

## 2017-02-22 PROCEDURE — 99218 HC RM OBSERVATION: CPT

## 2017-02-22 PROCEDURE — 74011250636 HC RX REV CODE- 250/636

## 2017-02-22 RX ORDER — OXYCODONE AND ACETAMINOPHEN 5; 325 MG/1; MG/1
1 TABLET ORAL
Qty: 30 TAB | Refills: 0 | Status: SHIPPED | OUTPATIENT
Start: 2017-02-22

## 2017-02-22 RX ADMIN — DEXTROSE MONOHYDRATE, SODIUM CHLORIDE, AND POTASSIUM CHLORIDE 75 ML/HR: 50; 4.5; 1.49 INJECTION, SOLUTION INTRAVENOUS at 07:39

## 2017-02-22 NOTE — PROGRESS NOTES
1951  Received pt in stable condition,   General: lying in bed in supine, not apparent distress, 0 pain, voiced no compliants at this time. Neuro: AOx4  Cardio: denies chest pain  Respiratory: denies shortness of breath  Skin: Dressing to left breast clean, dry and intact  GI: voiding  : denies nausea and vomiting  Mus: ambulates w/o assistance  Bed in low position and call bell within reach. [address] [place] [address], denies pain, denies chest pain,no SOB, denies n/v, voice no c/o at this time, CARA x2 intact,patent and draining.

## 2017-02-22 NOTE — DISCHARGE SUMMARY
[address] [place] SUMMARY    Name:  Josephine Francois  MR#:  [id number]  :  1940  Account #:  [de-identified]  Date of Adm:  2017  Date of Discharge:  2017      DATE OF ADMISSION: 2017    DATE OF DISCHARGE: 2017    DIAGNOSIS: Left breast cancer. HOSPITAL COURSE: The patient is a 72-year-old woman with left  breast cancer who was admitted to the hospital on 2017 for a  total mastectomy with sentinel lymph node biopsy. This went without  problem and she recovered nicely on the same day. On hospital day 1,  she was sitting in a chair, tolerating a regular diet with controlled pain. Her CARA has put out 130 mL of serosanguineous fluid overnight, and  she was doing well with her incentive spirometry. She is being sent  home on Percocet 5/325 one p.o. q.6 hours p.r.n. and follow up in 2  weeks or when the drains drain less than 20 mL a day. She will have  home health for drain care.         MD Edson Garcia / Matt Cruise  D:  2017   09:32  T:  2017   10:04  Job #:  [id number]

## 2017-02-22 NOTE — PROGRESS NOTES
Bedside and Verbal shift change report given to Tad Collins RN (oncoming nurse) by Shimon Barrow RN (offgoing nurse). Report included the following information SBAR, Kardex, MAR and Recent Results. SITUATION:    Code Status: No Order   Reason for Admission: Malignant neoplasm of left female breast, unspecified site of breast ([facility] [address] day: 1   Problem List:       Hospital Problems  Date Reviewed: 2/6/2017    None          BACKGROUND:    Past Medical History:   Past Medical History:   Diagnosis Date    Breast CA ([facility] [address] 2017    left     High cholesterol     Hypertension     Thyroid disease     TIA (transient ischemic attack)     approx 8 years ago         Patient taking anticoagulants yes     ASSESSMENT:    Changes in Assessment Throughout Shift: none     Patient has Central Line: no Reasons if yes:      Patient has Oneal Cath: no Reasons if yes:          Last Vitals:     Vitals:    02/21/17 1552 02/21/17 1941 02/22/17 0039 02/22/17 0600   BP: 117/67 123/64 126/64 132/72   Pulse: (!) 107 (!) 107 87 77   Resp: 16 18 18 16   Temp: 96.9 °F (36.1 °C) 97.1 °F (36.2 °C) 97.2 °F (36.2 °C) 97 °F (36.1 °C)   SpO2: 97% 99% 100% 99%   Weight:       Height:            IV and DRAINS (will only show if present)   Peripheral IV 02/21/17 Right Arm-Site Assessment: Clean, dry, & intact  Luis-Styles Drain 02/21/17 Left;Upper Breast-Site Assessment: Clean, dry, & intact  Luis-Styles Drain 02/21/17 Left; Lower Breast-Site Assessment: Clean, dry, & intact     WOUND (if present)   Wound Type:  surgical   Dressing present  yes   Wound Concerns/Notes:  none     PAIN    Pain Assessment    Pain Intensity 1: 0 (02/22/17 0641)              Patient Stated Pain Goal: 5  o Interventions for Pain:  Denies pain  o Intervention effective: denies pain  o Time of last intervention: no pain   o Reassessment Completed: yes      Last 3 Weights:  Last 3 Recorded Weights in this Encounter    02/08/17 1444   Weight: 52.6 kg (116 lb)     Weight change:      INTAKE/OUPUT    Current Shift:      Last three shifts: 02/20 1901 - 02/22 0700  In: 900 [I.V.:900]  Out: 1705 [Urine:1400; Drains:130]     LAB RESULTS     Recent Labs      02/21/17   1640   WBC  10.6   HGB  13.8   HCT  40.4   PLT  225        Recent Labs      02/22/17   0119   NA  135*   K  4.2   GLU  165*   BUN  15   CREA  0.83   CA  8.4*       RECOMMENDATIONS AND DISCHARGE PLANNING     1. Pending tests/procedures/ Plan of Care or Other Needs: drain care, labs     2. Discharge plan for patient and Needs/Barriers:     3. Estimated Discharge Date: 2/22/17 Posted on Whiteboard in [facility] Room: yes      4. The patient's care plan was reviewed with the oncoming nurse. \"HEALS\" SAFETY CHECK      Fall Risk    Total Score: 1    Safety Measures: Safety Measures: Bed/Chair-Wheels locked, Bed in low position, Call light within reach, Fall prevention (comment), Gripper socks    A safety check occurred in the patient's room between off going nurse and oncoming nurse listed above. The safety check included the below items  Area Items   H  High Alert Medications - Verify all high alert medication drips (heparin, PCA, etc.)   E  Equipment - Suction is set up for ALL patients (with bonifacio)  - Red plugs utilized for all equipment (IV pumps, etc.)  - WOWs wiped down at end of shift.  - Room stocked with oxygen, suction, and other unit-specific supplies   A  Alarms - Bed alarm is set for fall risk patients  - Ensure chair alarm is in place and activated if patient is up in a chair   L  Lines - Check IV for any infiltration  - Oneal bag is empty if patient has a Oneal   - Tubing and IV bags are labeled   S  Safety   - Room is clean, patient is clean, and equipment is clean. - Hallways are clear from equipment besides carts.    - Fall bracelet on for fall risk patients  - Ensure room is clear and free of clutter  - Suction is set up for ALL patients (with bonifacio)  - Hallways are clear from equipment besides carts.    - Isolation precautions followed, supplies available outside room, sign posted     Jaime Torres RN

## 2017-02-22 NOTE — PROGRESS NOTES
Surgery  S/p Left mastectomy  Doing well no c/p  AF VSS drains 130  Labs reviewed  annabel po home today

## 2017-02-22 NOTE — ROUTINE PROCESS
Pt is stable up in chair. No distress noted. Saulo drain are patent and draining with about 10 ml serosanguinous output. . Pt denies pain. Dressing is clean, dry and intact. will continue to monitor

## 2017-02-22 NOTE — DISCHARGE INSTRUCTIONS
DISCHARGE SUMMARY from Nurse    The following personal items are in your possession at time of discharge:    Dental Appliances: None  Visual Aid: Glasses, With patient     Home Medications: None  Jewelry: None  Clothing: Pants, Shirt, Undergarments, Footwear  Other Valuables: None             PATIENT INSTRUCTIONS:    After general anesthesia or intravenous sedation, for 24 hours or while taking prescription Narcotics:  · Limit your activities  · Do not drive and operate hazardous machinery  · Do not make important personal or business decisions  · Do  not drink alcoholic beverages  · If you have not urinated within 8 hours after discharge, please contact your surgeon on call. Report the following to your surgeon:  · Excessive pain, swelling, redness or odor of or around the surgical area  · Temperature over 100.5  · Nausea and vomiting lasting longer than 4 hours or if unable to take medications  · Any signs of decreased circulation or nerve impairment to extremity: change in color, persistent  numbness, tingling, coldness or increase pain  · Any questions        What to do at Home:  Recommended activity: No lifting, Driving, or Strenuous exercise. No tub bath or showers. If you experience any of the following symptoms severe pain, excessive bleeding and numbness or tingling from incision please follow up with Dr. Cricket Yang. *  Please give a list of your current medications to your Primary Care Provider. *  Please update this list whenever your medications are discontinued, doses are      changed, or new medications (including over-the-counter products) are added. *  Please carry medication information at all times in case of emergency situations. These are general instructions for a healthy lifestyle:    No smoking/ No tobacco products/ Avoid exposure to second hand smoke    Surgeon General's Warning:  Quitting smoking now greatly reduces serious risk to your health.     Obesity, smoking, and sedentary lifestyle greatly increases your risk for illness    A healthy diet, regular physical exercise & weight monitoring are important for maintaining a healthy lifestyle    You may be retaining fluid if you have a history of heart failure or if you experience any of the following symptoms:  Weight gain of 3 pounds or more overnight or 5 pounds in a week, increased swelling in our hands or feet or shortness of breath while lying flat in bed. Please call your doctor as soon as you notice any of these symptoms; do not wait until your next office visit. Recognize signs and symptoms of STROKE:    F-face looks uneven    A-arms unable to move or move unevenly    S-speech slurred or non-existent    T-time-call 911 as soon as signs and symptoms begin-DO NOT go       Back to bed or wait to see if you get better-TIME IS BRAIN. Warning Signs of HEART ATTACK     Call 911 if you have these symptoms:   Chest discomfort. Most heart attacks involve discomfort in the center of the chest that lasts more than a few minutes, or that goes away and comes back. It can feel like uncomfortable pressure, squeezing, fullness, or pain.  Discomfort in other areas of the upper body. Symptoms can include pain or discomfort in one or both arms, the back, neck, jaw, or stomach.  Shortness of breath with or without chest discomfort.  Other signs may include breaking out in a cold sweat, nausea, or lightheadedness. Don't wait more than five minutes to call 911 - MINUTES MATTER! Fast action can save your life. Calling 911 is almost always the fastest way to get lifesaving treatment. Emergency Medical Services staff can begin treatment when they arrive -- up to an hour sooner than if someone gets to the hospital by car. The discharge information has been reviewed with the patient and caregiver. The patient and caregiver verbalized understanding.     Discharge medications reviewed with the patient and caregiver and appropriate educational materials and side effects teaching were provided. MyChart Activation    Thank you for requesting access to [employer]. Please follow the instructions below to securely access and download your online medical record. [employer] allows you to send messages to your doctor, view your test results, renew your prescriptions, schedule appointments, and more. How Do I Sign Up? 1. In your internet browser, go to [email or web address]  2. Click on the First Time User? Click Here link in the Sign In box. You will be redirect to the New Member Sign Up page. 3. Enter your [employer] Access Code exactly as it appears below. You will not need to use this code after youve completed the sign-up process. If you do not sign up before the expiration date, you must request a new code. [employer] Access Code: [email or web address]  Expires: 4/3/2017  3:23 PM (This is the date your [employer] access code will )    4. Enter the last four digits of your Social Security Number (xxxx) and Date of Birth (mm/dd/yyyy) as indicated and click Submit. You will be taken to the next sign-up page. 5. Create a [employer] ID. This will be your [employer] login ID and cannot be changed, so think of one that is secure and easy to remember. 6. Create a [employer] password. You can change your password at any time. 7. Enter your Password Reset Question and Answer. This can be used at a later time if you forget your password. 8. Enter your e-mail address. You will receive e-mail notification when new information is available in [address] 9. Click Sign Up. You can now view and download portions of your medical record. 10. Click the Download Summary menu link to download a portable copy of your medical information. Additional Information    If you have questions, please visit the Frequently Asked Questions section of the [employer] website at [email or web address] Remember, [employer] is NOT to be used for urgent needs.  For medical emergencies, dial 911.     Patient armband removed and shredded

## 2017-02-22 NOTE — PROGRESS NOTES
Care Management Interventions  PCP Verified by CM: Yes  Last Visit to PCP: 02/08/17  Transition of Care Consult (CM Consult): Other (No home health orders written. Discharge indicates pt going home with self care.)  [address]: Yes  Current Support Network: Lives with Spouse  Confirm Follow Up Transport: Family  Plan discussed with Pt/Family/Caregiver: Yes  Freedom of Choice Offered: Yes (For home health)  Discharge Location  Discharge Placement: Home    Pt is a 78year old female admitted for malignant neoplasm of left female breast, unspecified site. Pt is alert and oriented and alone in room. Pt states that she resides with her spouse Merlin Sandy ([phone]) and her plan is to return home at discharge. Pt indicates that she is independent with ADLs and ambulation and reports that she uses CPAP provided to her by Matt Thurman. Pt's spouse will be assisting her with transportation home. Pt mentions that physician shares that he will order home health. FOC presented and pt [facility]. Pt's nurse shares that physician's office sets up home health for pt. No visible orders available at this time. Discharge indicates pt is going home with self care. Guide to observation care presented to pt. Form signed and original placed in pt's paper lite chart. Copy given to pt.

## 2017-02-22 NOTE — PROGRESS NOTES
Pt discharged to home. Transport assisted to [address] is stable and left with  and daughter. Teachng were provided on Saulo drain (draining and patent).

## 2017-02-23 NOTE — OP NOTES
[address]    Name:  Francine Frost  MR#:  [id number]  :  1940  Account #:  [de-identified]  Date of Adm:  2017  Date of Surgery:  2017      PREOPERATIVE DIAGNOSIS: Left breast cancer. POSTOPERATIVE DIAGNOSIS: Left breast cancer. PROCEDURES PERFORMED: Left breast total mastectomy with  sentinel lymph node biopsy. SURGEON: Luz Maria Lopez. MD Zayra    ASSISTANT: .    ESTIMATED BLOOD LOSS: Minimal.    COMPLICATIONS: None. ANESTHESIA: General.    SPECIMENS REMOVED  1. Breast tissue. 2. Gary lymph nodes. INDICATIONS: The patient is a 81-year-old woman with a known left  breast carcinoma who has decided to have a total mastectomy with  sentinel lymph node biopsy for treatment. She is here for that  operation. DESCRIPTION OF PROCEDURE: After appropriate antibiotics and  sequential compression stockings, the patient was taken to the  operating room, placed in a supine position on the OR table. After  adequate general anesthesia, she had a small chest roll placed and  her arm was placed in abduction. Her chest and breast were prepped  and draped to [facility] standards. Standard elliptical mastectomy incision  was created and a superior based flap was formed using the Bovie  electrocautery. This flap was taken superiorly to the clavicle and deep  to the pectoral fascia. The flap was created with the Bovie from lateral  to medial and part of the breast tissue was taken off with the Bovie,  taking with it the pectoral fascia. The bottom of the elliptical incision  was then created and Bovie was used to create an inferior-based flap  on this area. Once this flap was created and the skin thickness was  appropriate, the breast was removed from medial to lateral using the  Bovie and taking the pectoral fascia with it. Laterally, it was amputated  and then using the gamma probe, the left axilla was searched for  lymphadenopathy.  A normal-appearing lymph node was noted to be  hot and blue. She had been injected with radionuclide as well as  Lymphazurin Blue prior to the start of the operation. This hot blue node  was then removed and noted to have high counts per second. It was  labeled as sentinel node. Further radiation in the axilla was not found  and the axilla and chest were copiously irrigated and suctioned free. Hemostasis was good and the wound was closed over two #10 flat CARA  drains, one under the flaps and one under the axilla. These drains  were brought out through the inferior flap. They were sutured into  position with 2-0 nylon. The flaps were then closed with 3-0 Vicryl and  4-0 running Monocryl. Steri-Strips and sterile dressing were applied. Marcaine 0.25% with epinephrine was used around the wound. She  tolerated the procedure well, and was taken to recovery in stable  condition.         MD Earl Dahl / Priya Mittal  D:  02/23/2017   13:23  T:  02/23/2017   15:29  Job #:  [id number]

## 2017-02-24 NOTE — H&P
Progress Notes  Encounter Date: 2/6/2017  Paulette Simmons MD   Surgery      []Hide copied text  []Ken for attribution information  Progress Note     Patient: Zoey Campbell  MRN: [id number]  SSN: [id number]      1   YOB: 1940  Age: 78 y.o. Sex: female           Chief Complaint   Patient presents with    Breast Cancer       left breast cancer; on Plavix for about 7yr after TIA          HPI   Ms. Lior Beaver is a 72-year-old woman who presents after a biopsy by radiology shows infiltrating ductal carcinoma in her left breast. She has a 6.6 mm lesion in the upper outer quadrant of her left breast detected by screening mammography. She has some distant cousins that have had breast cancer but no primary relatives. Her menarche was 17 and her first child in her early 25s. She has no other risk factors for breast cancer. This is an asymptomatic lesion.     No past medical history on file.   Past Surgical History   Procedure Laterality Date    Hx hysterectomy          No Known Allergies         Current Outpatient Prescriptions   Medication Sig Dispense Refill    ALPRAZolam (XANAX) 0.5 mg tablet TK 1 T PO ONCE A DAY FOR 30 DAYS   0    amLODIPine (NORVASC) 2.5 mg tablet TK 1 T PO ONCE A DAY   3    atorvastatin (LIPITOR) 40 mg tablet TK 1 T PO ONCE A DAY   2    levothyroxine (SYNTHROID) 25 mcg tablet TK 1 T PO QD IN THE MORNING OES   2    clopidogrel (PLAVIX) 75 mg tab TK 1 T PO QD   2      Social History            Social History    Marital status:        Spouse name: N/A    Number of children: N/A    Years of education: N/A          Occupational History    Not on file.           Social History Main Topics    Smoking status: Not on file    Smokeless tobacco: Not on file    Alcohol use Not on file    Drug use: Not on file    Sexual activity: Not on file           Other Topics Concern    Not on file          Social History Narrative    No narrative on file             Family History Problem Relation Age of Onset    Cancer Mother 39       Throat            Review of systems:  Patient denies any reflux, emesis, abdominal pain, change in bowel habits, hematochezia, melena, fever, weight loss, fatigue chills, dermatitis, abnormal moles, change in vision, vertigo, epistaxis, dysphagia, hoarseness, chest pain, palpitations, hypertension, edema, cough, shortness of breath, wheezing, hemoptysis, snoring, hematuria, diabetes, thyroid disease, anemia, bruising, history of blood transfusion, dizziness, headache, or fainting.     Physical Examination     Well developed well nourished female in no apparent distress       Visit Vitals    Resp 20    Ht 4' 11.5\" (1.511 m)    Wt 50.8 kg (112 lb)    BMI 22.24 kg/m2      Head: normocephalic, atraumatic  Mouth: Clear, no overt lesions, oral mucosa pink and moist  Neck: supple, no masses, no adenopathy or carotid bruits, trachea midline  Resp: clear to auscultation bilaterally, no wheeze, rhonchi or rales, excursions normal and symmetrical  Cardio: Regular rate and rhythm, no murmurs, clicks, gallops or rubs, no edema or varicosities  Abdomen: soft, nontender, nondistended, normoactive bowel sounds, no hernias, no hepatosplenomegaly,   Back: Deferred  Extremeties: warm, well-perfused, no tenderness or swelling, normal gait/station  Neuro: sensation and strength grossly intact and symmetrical  Psych: alert and oriented to person, place and time  Breast exam bilateral breast exam without dominant mass, skin change, nipple discharge, or lymphadenopathy. Contused left breast at biopsy site.     IMPRESSION  Left breast cancer. We have talked in detail for greater than an hour about the treatment of breast cancer to include surgical options radiation, chemotherapy, and hormone therapy. We reviewed data from the NCCN guidelines as well as survival rates for both breast conservation and mastectomy. We have discussed sentinel lymph node biopsy as well.  She is decided for a left mastectomy with sentinel lymph node biopsy.     PLAN         Orders Placed This Encounter    NM LYMPHOSCINTIG LT BREAST       Standing Status:  Future       Standing Expiration Date:  3/6/2018       Scheduling Instructions:         Per Dilcia in nuc med, schedule for 7:30a       Order Specific Question:  Reason for Exam       Answer:  left breast cancer    NM LYMPHOSCINTIG LT BREAST       Standing Status:  Future       Standing Expiration Date:  3/6/2018       Scheduling Instructions:         Per Dilcia in nuc med, please schedule for 7:30a       Order Specific Question:  Reason for Exam       Answer:  left breast cancer    ALPRAZolam (XANAX) 0.5 mg tablet       Sig: TK 1 T PO ONCE A DAY FOR 30 DAYS       Refill: 0    amLODIPine (NORVASC) 2.5 mg tablet       Sig: TK 1 T PO ONCE A DAY       Refill: 3    atorvastatin (LIPITOR) 40 mg tablet       Sig: TK 1 T PO ONCE A DAY       Refill: 2    clopidogrel (PLAVIX) 75 mg tab       Sig: TK 1 T PO QD       Refill: 2    levothyroxine (SYNTHROID) 25 mcg tablet       Sig: TK 1 T PO QD IN THE MORNING OES       Refill: 2      Left total mastectomy with sentinel lymph node biopsy after Plavix has been held for 2 weeks.   Akhil Patterson MD                         Electronically signed by Akhil Patterson MD at 02/06/17 9943        Office Visit on 2/6/2017              Detailed Report             Note shared with patient   Note Details   Author Akhil Patterson MD File Time 02/06/17 1756   Author Type Physician Status Signed   Last  Akhil Patterson MD Specialty Surgery

## 2017-03-01 ENCOUNTER — OFFICE VISIT (OUTPATIENT)
Dept: SURGERY | Age: 77
End: 2017-03-01

## 2017-03-01 VITALS — RESPIRATION RATE: 18 BRPM | BODY MASS INDEX: 22.78 KG/M2 | HEIGHT: 60 IN | WEIGHT: 116 LBS | OXYGEN SATURATION: 99 %

## 2017-03-01 DIAGNOSIS — C50.412 BREAST CANCER OF UPPER-OUTER QUADRANT OF LEFT FEMALE BREAST (HCC): Primary | ICD-10-CM

## 2017-03-01 NOTE — MR AVS SNAPSHOT
Visit Information Date & Time Provider Department Dept. Phone Encounter #  
 3/1/2017  9:00 AM Remi Carrasquillo [facility] [phone] [id number] Your Appointments 3/22/2017  9:30 AM  
Follow Up with Curt Marr MD  
[address] [facility]) Appt Note: 3 week fu  
 [address] [facility] [address] [facility] [address]  
[phone]  
  
   
 [address] [facility] [address] [facility] [place] [address] Upcoming Health Maintenance Date Due DTaP/Tdap/Td series (1 - Tdap) 7/20/1963 FOBT Q 1 YEAR AGE 50-75 7/20/1992 ZOSTER VACCINE AGE 60> 7/20/2002 GLAUCOMA SCREENING Q2Y 7/20/2007 OSTEOPOROSIS SCREENING (DEXA) 7/20/2007 Pneumococcal 65+ High/Highest Risk (1 of 2 - PCV13) 7/20/2007 MEDICARE YEARLY EXAM 7/20/2007 INFLUENZA AGE 9 TO ADULT 8/1/2016 Allergies as of 3/1/2017  Review Complete On: 3/1/2017 By: Curt Marr MD  
 No Known Allergies Current Immunizations  Never Reviewed No immunizations on file. Not reviewed this visit You Were Diagnosed With   
  
 Codes Comments Breast cancer of upper-outer quadrant of left female breast ([facility]    -  Primary ICD-10-CM: W39.100 ICD-9-CM: 174.4 Vitals Resp  
  
  
  
  
  
 18 BMI and BSA Data Body Mass Index Body Surface Area 23.04 kg/m 2 1.49 m 2 Your Updated Medication List  
  
   
This list is accurate as of: 3/1/17  9:49 AM.  Always use your most recent med list.  
  
  
  
  
 ALPRAZolam 0.5 mg tablet Commonly known as:  Wendall Samuel TK 1 T PO ONCE A DAY FOR 30 DAYS  
  
 amLODIPine 2.5 mg tablet Commonly known as:  Love Breach TK 1 T PO  ONCE A DAY  
  
 aspirin delayed-release 81 mg tablet Take 81 mg by mouth daily. atorvastatin 40 mg tablet Commonly known as:  LIPITOR TK 1 T PO  ONCE A DAY  
  
 clopidogrel 75 mg Tab Commonly known as:  PLAVIX TK 1 T PO QD  
  
 levothyroxine 25 mcg tablet Commonly known as:  SYNTHROID TK 1 T PO QD IN THE MORNING OES  
  
 oxyCODONE-acetaminophen 5-325 mg per tablet Commonly known as:  PERCOCET Take 1 Tab by mouth every six (6) hours as needed for Pain. Max Daily Amount: 4 Tabs. Patient Instructions Call the office at [phone] if you have any questions or concerns. Introducing [facility] SERVICES! [address] [facility] introduces [employer]yt[employer] F[employer] patient portal. Now you can access parts of your medical record, email your doctor's office, and request medication refills online. 1. In your internet browser, go to [email or web address] 2. Click on the First Time User? Click Here link in the Sign In box. You will see the New Member Sign Up page. 3. Enter your Anytime Fitness Access Code exactly as it appears below. You will not need to use this code after youve completed the sign-up process. If you do not sign up before the expiration date, you must request a new code. · Anyt[employer] Fitness Access Code: [email or web address] Expires: 4/3/2017  3:23 PM 
 
4. Enter the last four digits of your Social Security Number (xxxx) and Date of Birth (mm/dd/yyyy) as indicated and click Submit. You will be taken to the next sign-up page. 5. Create a Anytime Fitness ID. This will be your Anytime Fitness login ID and cannot be changed, so think of one that is secure and easy to remember. 6. Create a Anytime Fitness password. You can change your password at any time. 7. Enter your Password Reset Question and Answer. This can be used at a later time if you forget your password. 8. Enter your e-mail address. You will receive e-mail notification when new information is available in [address] 9. Click Sign Up. You can now view and download portions of your medical record. 10. Click the Download Summary menu link to download a portable copy of your medical information.  
 
If you have questions, please visit the Frequently Asked Questions section of the Mind-NRG. Remember, [email or web address] is NOT to be used for urgent needs. For medical emergencies, dial 911. Now available from your iPhone and Android! Please provide this summary of care documentation to your next provider. Your primary care clinician is listed as Roverto Perez. If you have any questions after today's visit, please call [phone].

## 2017-03-01 NOTE — PROGRESS NOTES
Progress Note    Patient: Mandi Nelson  MRN: [id number]  SSN: [id number]   YOB: 1940  Age: 78 y.o. 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 Andressa King is status post a left total mastectomy with sentinel lymph node biopsy for a T2 N0 MX lesion of her left breast.  She is back today doing well and in fact doing great. Her wound looks good I removed her drains in the office and she is set up to see Dr. Angeline Obrien for medical oncology as she is ER OK positive HER-2 negative. Past Medical History:   Diagnosis Date    Breast CA ([address] 2017    left     High cholesterol     Hypertension     Thyroid disease     TIA (transient ischemic attack)     approx 8 years ago     Past Surgical History:   Procedure Laterality Date    HX HEENT Left     retina surgery    HX HYSTERECTOMY      [place] MASTECTOMY, SIMPLE, COMPLETE Left 02/21/2017    Dr. Rodríguez Noss     No Known Allergies  Current Outpatient Prescriptions   Medication Sig Dispense Refill    oxyCODONE-acetaminophen (PERCOCET) 5-325 mg per tablet Take 1 Tab by mouth every six (6) hours as needed for Pain. Max Daily Amount: 4 Tabs. 30 Tab 0    aspirin delayed-release 81 mg tablet Take 81 mg by mouth daily.  ALPRAZolam (XANAX) 0.5 mg tablet TK 1 T PO ONCE A DAY FOR 30 DAYS  0    amLODIPine (NORVASC) 2.5 mg tablet TK 1 T PO  ONCE A DAY  3    atorvastatin (LIPITOR) 40 mg tablet TK 1 T PO  ONCE A DAY  2    levothyroxine (SYNTHROID) 25 mcg tablet TK 1 T PO QD IN THE MORNING OES  2    clopidogrel (PLAVIX) 75 mg tab TK 1 T PO QD  2     Social History     Social History    Marital status:      Spouse name: N/A    Number of children: N/A    Years of education: N/A     Occupational History    Not on file.      Social History Main Topics    Smoking status: Never Smoker    Smokeless tobacco: Not on file    Alcohol use Yes Comment: occasionally    Drug use: No    Sexual activity: Not on file     Other Topics Concern    Not on file     Social History Narrative     Family History   Problem Relation Age of Onset    Cancer Mother 39     Throat         Review of systems:  Patient denies any reflux, emesis, abdominal pain, change in bowel habits, hematochezia, melena, fever, weight loss, fatigue chills, dermatitis, abnormal moles, change in vision, vertigo, epistaxis, dysphagia, hoarseness, chest pain, palpitations, hypertension, edema, cough, shortness of breath, wheezing, hemoptysis, snoring, hematuria, diabetes, thyroid disease, anemia, bruising, history of blood transfusion, dizziness, headache, or fainting. Physical Examination    Well developed well nourished female in no apparent distress  Visit Vitals    Resp 18    Ht 4' 11.5\" (1.511 m)    Wt 52.6 kg (116 lb)    SpO2 99%    BMI 23.04 kg/m2      Head: normocephalic, atraumatic  Mouth: Clear, no overt lesions, oral mucosa pink and moist  Neck: supple, no masses, no adenopathy or carotid bruits, trachea midline  Resp: clear to auscultation bilaterally, no wheeze, rhonchi or rales, excursions normal and symmetrical  Cardio: Regular rate and rhythm, no murmurs, clicks, gallops or rubs, no edema or varicosities  Abdomen: soft, nontender, nondistended, normoactive bowel sounds, no hernias, no hepatosplenomegaly,   Back: Deferred  Extremeties: warm, well-perfused, no tenderness or swelling, normal gait/station  Neuro: sensation and strength grossly intact and symmetrical  Psych: alert and oriented to person, place and time  Breast exam healing left mastectomy site    IMPRESSION  Status post left mastectomy for the above tumor    PLAN  No orders of the defined types were placed in this encounter.     Follow-up in 3 weeks for wound check  Jada Ram MD

## 2017-03-22 ENCOUNTER — OFFICE VISIT (OUTPATIENT)
Dept: SURGERY | Age: 77
End: 2017-03-22

## 2017-03-22 DIAGNOSIS — C50.412 BREAST CANCER OF UPPER-OUTER QUADRANT OF LEFT FEMALE BREAST (HCC): Primary | ICD-10-CM

## 2017-03-22 NOTE — MR AVS SNAPSHOT
Visit Information Date & Time Provider Department Dept. Phone Encounter #  
 3/22/2017  9:30 AM Alphonzo Crape, MD [facility] [phone] [id number] Upcoming Health Maintenance Date Due DTaP/Tdap/Td series (1 - Tdap) 7/20/1963 FOBT Q 1 YEAR AGE 50-75 7/20/1992 ZOSTER VACCINE AGE 60> 7/20/2002 GLAUCOMA SCREENING Q2Y 7/20/2007 OSTEOPOROSIS SCREENING (DEXA) 7/20/2007 Pneumococcal 65+ High/Highest Risk (1 of 2 - PCV13) 7/20/2007 MEDICARE YEARLY EXAM 7/20/2007 INFLUENZA AGE 9 TO ADULT 8/1/2016 Allergies as of 3/22/2017  Review Complete On: 3/22/2017 By: Curt Marr MD  
 No Known Allergies Current Immunizations  Never Reviewed No immunizations on file. Not reviewed this visit Vitals OB Status Smoking Status Hysterectomy Never Smoker Your Updated Medication List  
  
   
This list is accurate as of: 3/22/17 10:51 AM.  Always use your most recent med list.  
  
  
  
  
 ALPRAZolam 0.5 mg tablet Commonly known as:  Wendall Samuel TK 1 T PO ONCE A DAY FOR 30 DAYS  
  
 amLODIPine 2.5 mg tablet Commonly known as:  Love Breach TK 1 T PO  ONCE A DAY  
  
 aspirin delayed-release 81 mg tablet Take 81 mg by mouth daily. atorvastatin 40 mg tablet Commonly known as:  LIPITOR TK 1 T PO  ONCE A DAY  
  
 clopidogrel 75 mg Tab Commonly known as:  PLAVIX TK 1 T PO QD  
  
 levothyroxine 25 mcg tablet Commonly known as:  SYNTHROID TK 1 T PO QD IN THE MORNING OES  
  
 oxyCODONE-acetaminophen 5-325 mg per tablet Commonly known as:  PERCOCET Take 1 Tab by mouth every six (6) hours as needed for Pain. Max Daily Amount: 4 Tabs. Introducing [facility] & HEALTH SERVICES! [facility] introduces [employer] patient portal. Now you can access parts of your medical record, email your doctor's office, and request medication refills online.    
 
1. In your internet browser, go to [email or web address] 2. Click on the First Time User? Click Here link in the Sign In box. You will see the New Member Sign Up page. 3. Enter your RxVault.in Access Code exactly as it appears below. You will not need to use this code after youve completed the sign-up process. If you do not sign up before the expiration date, you must request a new code. · RxVault.in Access Code: [email or web address] Expires: 4/3/2017  4:23 PM 
 
4. Enter the last four digits of your Social Security Number (xxxx) and Date of Birth (mm/dd/yyyy) as indicated and click Submit. You will be taken to the next sign-up page. 5. Create a Riset ID. This will be your RxVault.in login ID and cannot be changed, so think of one that is secure and easy to remember. 6. Create a RxVault.in password. You can change your password at any time. 7. Enter your Password Reset Question and Answer. This can be used at a later time if you forget your password. 8. Enter your e-mail address. You will receive e-mail notification when new information is available in [address] 9. Click Sign Up. You can now view and download portions of your medical record. 10. Click the Download Summary menu link to download a portable copy of your medical information. If you have questions, please visit the Frequently Asked Questions section of the RxVault.in website. Remember, RxVault.in is NOT to be used for urgent needs. For medical emergencies, dial 911. Now available from your iPhone and Android! Please provide this summary of care documentation to your next provider. Your primary care clinician is listed as Jeffrey Martinez. If you have any questions after today's visit, please call [phone].

## 2017-03-22 NOTE — PROGRESS NOTES
Progress Note    Patient: Jorge Catherine  MRN: [id number]  SSN: [id number]   YOB: 1940  Age: 78 y.o. Sex: female     No chief complaint on file. HPI    Ms Shani Rizo returns after her mastectomy which turns out to be a T2 N0 M0 tumor. Her wound looks great and she is doing very well. She has significant concerns about being offered chemotherapy. She is not sure she wants to do it and we have had a long talk about chemotherapy and its risks and benefits. We have talked about Oncotype DX and I have discussed her candidacy for this test with our breast care navigator. She would like to think about this rather than start right away and I do not blame her. We have talked about the NCCN guidelines, as well as genetic risk. Past Medical History:   Diagnosis Date    Breast CA ([address] 2017    left     High cholesterol     Hypertension     Thyroid disease     TIA (transient ischemic attack)     approx 8 years ago     Past Surgical History:   Procedure Laterality Date    HX HEENT Left     retina surgery    HX HYSTERECTOMY      [place] MASTECTOMY, SIMPLE, COMPLETE Left 02/21/2017    Dr. James Durham     No Known Allergies  Current Outpatient Prescriptions   Medication Sig Dispense Refill    oxyCODONE-acetaminophen (PERCOCET) 5-325 mg per tablet Take 1 Tab by mouth every six (6) hours as needed for Pain. Max Daily Amount: 4 Tabs. 30 Tab 0    aspirin delayed-release 81 mg tablet Take 81 mg by mouth daily.       ALPRAZolam (XANAX) 0.5 mg tablet TK 1 T PO ONCE A DAY FOR 30 DAYS  0    amLODIPine (NORVASC) 2.5 mg tablet TK 1 T PO  ONCE A DAY  3    atorvastatin (LIPITOR) 40 mg tablet TK 1 T PO  ONCE A DAY  2    clopidogrel (PLAVIX) 75 mg tab TK 1 T PO QD  2    levothyroxine (SYNTHROID) 25 mcg tablet TK 1 T PO QD IN THE MORNING OES  2     Social History     Social History    Marital status:      Spouse name: N/A    Number of children: N/A    Years of education: N/A     Occupational History    Not on file. Social History Main Topics    Smoking status: Never Smoker    Smokeless tobacco: Not on file    Alcohol use Yes      Comment: occasionally    Drug use: No    Sexual activity: Not on file     Other Topics Concern    Not on file     Social History Narrative     Family History   Problem Relation Age of Onset    Cancer Mother 39     Throat         Review of systems:  Patient denies any reflux, emesis, abdominal pain, change in bowel habits, hematochezia, melena, fever, weight loss, fatigue chills, dermatitis, abnormal moles, change in vision, vertigo, epistaxis, dysphagia, hoarseness, chest pain, palpitations, hypertension, edema, cough, shortness of breath, wheezing, hemoptysis, snoring, hematuria, diabetes, thyroid disease, anemia, bruising, history of blood transfusion, dizziness, headache, or fainting. Physical Examination    Well developed well nourished female in no apparent distress  There were no vitals taken for this visit. Head: normocephalic, atraumatic  Mouth: Clear, no overt lesions, oral mucosa pink and moist  Neck: supple, no masses, no adenopathy or carotid bruits, trachea midline  Resp: clear to auscultation bilaterally, no wheeze, rhonchi or rales, excursions normal and symmetrical  Cardio: Regular rate and rhythm, no murmurs, clicks, gallops or rubs, no edema or varicosities  Abdomen: soft, nontender, nondistended, normoactive bowel sounds, no hernias, no hepatosplenomegaly,   Back: Deferred  Extremeties: warm, well-perfused, no tenderness or swelling, normal gait/station  Neuro: sensation and strength grossly intact and symmetrical  Psych: alert and oriented to person, place and time  Breast exam left breast mastectomy wound healed nicely without skin change or lymphadenopathy    IMPRESSION  Stage II left breast cancer, T2 N0 M0 lesion    PLAN  No orders of the defined types were placed in this encounter.     Patient to decide whether chemotherapy is right for her  Yusuf Jaramillo Marcos Nolen MD

## 2017-04-03 ENCOUNTER — HOSPITAL ENCOUNTER (OUTPATIENT)
Dept: LAB | Age: 77
Discharge: HOME | End: 2017-04-03

## 2017-04-13 ENCOUNTER — DOCUMENTATION ONLY (OUTPATIENT)
Dept: SURGERY | Age: 77
End: 2017-04-13

## 2017-05-24 ENCOUNTER — DOCUMENTATION ONLY (OUTPATIENT)
Dept: ONCOLOGY | Age: 77
End: 2017-05-24

## 2017-05-24 NOTE — PROGRESS NOTES
Called patient to review Breast Cancer Survivorship Care Plan. It was mailed 5/9/2017 with note to call me to schedule review. Today,  informs me that patient not home. Left message with  to have patient call me.

## 2017-05-25 ENCOUNTER — DOCUMENTATION ONLY (OUTPATIENT)
Dept: ONCOLOGY | Age: 77
End: 2017-05-25

## 2017-05-25 NOTE — PROGRESS NOTES
Patient called and Breast Cancer Survivorship Care Plan reviewed (had been mailed in April, 2017). All questions answered. Copy faxed to Dr. Glenda Wesley, PCP. Invited to breast cancer support group.

## 2017-09-29 ENCOUNTER — OFFICE VISIT (OUTPATIENT)
Dept: SURGERY | Age: 77
End: 2017-09-29

## 2017-09-29 VITALS — DIASTOLIC BLOOD PRESSURE: 64 MMHG | SYSTOLIC BLOOD PRESSURE: 120 MMHG | RESPIRATION RATE: 18 BRPM

## 2017-09-29 DIAGNOSIS — Z85.3 PERSONAL HISTORY OF BREAST CANCER: Primary | ICD-10-CM

## 2017-09-29 RX ORDER — TAMOXIFEN CITRATE 10 MG/1
20 TABLET, FILM COATED ORAL DAILY
COMMUNITY
End: 2017-10-25 | Stop reason: DRUGHIGH

## 2017-09-29 NOTE — PROGRESS NOTES
Progress Note    Patient: Sara Newell  MRN: [id number]  SSN: [id number]   YOB: 1940  Age: 78 y.o. Sex: female     Chief Complaint   Patient presents with    Follow-up     6 month follow up       HPI    Ms Mary Jane Garza returns now 6 months after her mastectomy for a T2 N0 M0 lesion of her left breast.  She is done quite well. She is due for a mammogram on the right which we will order. Her wound looks great and she has no breast health complaints. She refused chemotherapy but is on tamoxifen. Past Medical History:   Diagnosis Date    [facility] 2017    left     High cholesterol     Hypertension     Thyroid disease     TIA (transient ischemic attack)     approx 8 years ago     Past Surgical History:   Procedure Laterality Date    HX HEENT Left     retina surgery    HX HYSTERECTOMY      [place] MASTECTOMY, SIMPLE, COMPLETE Left 02/21/2017    Dr. Kiara Wu     No Known Allergies  Current Outpatient Prescriptions   Medication Sig Dispense Refill    tamoxifen (NOLVADEX) 10 mg tablet Take 20 mg by mouth daily.  aspirin delayed-release 81 mg tablet Take 81 mg by mouth daily.  ALPRAZolam (XANAX) 0.5 mg tablet TK 1 T PO ONCE A DAY FOR 30 DAYS  0    amLODIPine (NORVASC) 2.5 mg tablet TK 1 T PO  ONCE A DAY  3    atorvastatin (LIPITOR) 40 mg tablet TK 1 T PO  ONCE A DAY  2    clopidogrel (PLAVIX) 75 mg tab TK 1 T PO QD  2    levothyroxine (SYNTHROID) 25 mcg tablet TK 1 T PO QD IN THE MORNING OES  2    oxyCODONE-acetaminophen (PERCOCET) 5-325 mg per tablet Take 1 Tab by mouth every six (6) hours as needed for Pain. Max Daily Amount: 4 Tabs. 30 Tab 0     Social History     Social History    Marital status:      Spouse name: N/A    Number of children: N/A    Years of education: N/A     Occupational History    Not on file.      Social History Main Topics    Smoking status: Never Smoker    Smokeless tobacco: Not on file    Alcohol use Yes      Comment: occasionally    Drug use: No    Sexual activity: Not on file     Other Topics Concern    Not on file     Social History Narrative     Family History   Problem Relation Age of Onset    Cancer Mother 39     Throat         Review of systems:  Patient denies any reflux, emesis, abdominal pain, change in bowel habits, hematochezia, melena, fever, weight loss, fatigue chills, dermatitis, abnormal moles, change in vision, vertigo, epistaxis, dysphagia, hoarseness, chest pain, palpitations, hypertension, edema, cough, shortness of breath, wheezing, hemoptysis, snoring, hematuria, diabetes, thyroid disease, anemia, bruising, history of blood transfusion, dizziness, headache, or fainting. Physical Examination    Well developed well nourished female in no apparent distress  Visit Vitals    /64    Resp 18      Head: normocephalic, atraumatic  Mouth: Clear, no overt lesions, oral mucosa pink and moist  Neck: supple, no masses, no adenopathy or carotid bruits, trachea midline  Resp: clear to auscultation bilaterally, no wheeze, rhonchi or rales, excursions normal and symmetrical  Cardio: Regular rate and rhythm, no murmurs, clicks, gallops or rubs, no edema or varicosities  Abdomen: soft, nontender, nondistended, normoactive bowel sounds, no hernias, no hepatosplenomegaly,   Back: Deferred  Extremeties: warm, well-perfused, no tenderness or swelling, normal gait/station  Neuro: sensation and strength grossly intact and symmetrical  Psych: alert and oriented to person, place and time  Breast exam well-healed left breast scar right breast without dominant mass, skin change, nipple discharge, lymphadenopathy    IMPRESSION  Early stage left breast cancer excised with mastectomy. Due for mammogram.    PLAN  Orders Placed This Encounter    tamoxifen (NOLVADEX) 10 mg tablet     Sig: Take 20 mg by mouth daily.      Order mammogram follow-up in 6 months  Chanda Dennis MD

## 2017-09-29 NOTE — MR AVS SNAPSHOT
Visit Information Date & Time Provider Department Dept. Phone Encounter #  
 9/29/2017  3:00 PM Remi Hernandez [facility] [id number] Upcoming Health Maintenance Date Due DTaP/Tdap/Td series (1 - Tdap) 7/20/1963 ZOSTER VACCINE AGE 60> 5/20/2002 GLAUCOMA SCREENING Q2Y 7/20/2007 OSTEOPOROSIS SCREENING (DEXA) 7/20/2007 Pneumococcal 65+ High/Highest Risk (1 of 2 - PCV13) 7/20/2007 MEDICARE YEARLY EXAM 7/20/2007 INFLUENZA AGE 9 TO ADULT 8/1/2017 Allergies as of 9/29/2017  Review Complete On: 9/29/2017 By: Wendy Orr LPN No Known Allergies Current Immunizations  Never Reviewed No immunizations on file. Not reviewed this visit Vitals BP Resp OB Status Smoking Status 120/64 18 Hysterectomy Never Smoker Vitals History Your Updated Medication List  
  
   
This list is accurate as of: 9/29/17  3:16 PM.  Always use your most recent med list.  
  
  
  
  
 ALPRAZolam 0.5 mg tablet Commonly known as:  [place] TK 1 T PO ONCE A DAY FOR 30 DAYS  
  
 amLODIPine 2.5 mg tablet Commonly known as:  Sundra [place] TK 1 T PO  ONCE A DAY  
  
 aspirin delayed-release 81 mg tablet Take 81 mg by mouth daily. atorvastatin 40 mg tablet Commonly known as:  LIPITOR TK 1 T PO  ONCE A DAY  
  
 clopidogrel 75 mg Tab Commonly known as:  PLAVIX TK 1 T PO QD  
  
 levothyroxine 25 mcg tablet Commonly known as:  SYNTHROID TK 1 T PO QD IN THE MORNING OES  
  
 oxyCODONE-acetaminophen 5-325 mg per tablet Commonly known as:  PERCOCET Take 1 Tab by mouth every six (6) hours as needed for Pain. Max Daily Amount: 4 Tabs. tamoxifen 10 mg tablet Commonly known as:  NOLVADEX Take 20 mg by mouth daily. Introducing [facility] & HEALTH SERVICES!    
 [facility] introduces [employer] patient portal. Now you can access parts of your medical record, email your doctor's office, and request medication refills online. 1. In your internet browser, go to [email or web address] 2. Click on the First Time User? Click Here link in the Sign In box. You will see the New Member Sign Up page. 3. Enter your PickUpPalt Access Code exactly as it appears below. You will not need to use this code after youve completed the sign-up process. If you do not sign up before the expiration date, you must request a new code. · iContainershart Access Code: [facility] Expires: 12/28/2017  2:50 PM 
 
4. Enter the last four digits of your Social Security Number (xxxx) and Date of Birth (mm/dd/yyyy) as indicated and click Submit. You will be taken to the next sign-up page. 5. Create a PickUpPalt ID. This will be your [employer] login ID and cannot be changed, so think of one that is secure and easy to remember. 6. Create a [employer] password. You can change your password at any time. 7. Enter your Password Reset Question and Answer. This can be used at a later time if you forget your password. 8. Enter your e-mail address. You will receive e-mail notification when new information is available in [address] 9. Click Sign Up. You can now view and download portions of your medical record. 10. Click the Download Summary menu link to download a portable copy of your medical information. If you have questions, please visit the Frequently Asked Questions section of the [employer] website. Remember, [employer] is NOT to be used for urgent needs. For medical emergencies, dial 911. Now available from your iPhone and Android! Please provide this summary of care documentation to your next provider. Your primary care clinician is listed as David Dangeol. If you have any questions after today's visit, please call [phone].

## 2017-10-11 ENCOUNTER — HOSPITAL ENCOUNTER (OUTPATIENT)
Dept: MAMMOGRAPHY | Age: 77
Discharge: HOME | End: 2017-10-11
Payer: MEDICARE

## 2017-10-11 DIAGNOSIS — Z85.3 PERSONAL HISTORY OF BREAST CANCER: ICD-10-CM

## 2017-10-11 PROCEDURE — 77061 BREAST TOMOSYNTHESIS UNI: CPT

## 2017-10-25 ENCOUNTER — OFFICE VISIT (OUTPATIENT)
Dept: SURGERY | Age: 77
End: 2017-10-25

## 2017-10-25 VITALS — RESPIRATION RATE: 18 BRPM | DIASTOLIC BLOOD PRESSURE: 82 MMHG | SYSTOLIC BLOOD PRESSURE: 122 MMHG

## 2017-10-25 DIAGNOSIS — Z85.3 PERSONAL HISTORY OF BREAST CANCER: Primary | ICD-10-CM

## 2017-10-25 RX ORDER — TAMOXIFEN CITRATE 20 MG/1
20 TABLET ORAL DAILY
Qty: 90 TAB | Refills: 1 | Status: SHIPPED | OUTPATIENT
Start: 2017-10-25 | End: 2018-04-18 | Stop reason: SDUPTHER

## 2017-10-25 NOTE — MR AVS SNAPSHOT
Visit Information Date & Time Provider Department Dept. Phone Encounter #  
 10/25/2017  1:30 PM Chanelle Guevara MD [facility] [id number] Follow-up Instructions Return in about 6 months (around 4/25/2018). Follow-up and Disposition History Your Appointments 4/18/2018 11:00 AM  
Follow Up with Chanelle Guevara MD  
[address]) Appt Note: 6 month fu after mammo; 6 month fu after mammo [address] [facility] [address] [facility] [address]  
[phone]  
  
   
 [address] [facility] [address] [address] Upcoming Health Maintenance Date Due DTaP/Tdap/Td series (1 - Tdap) 7/20/1963 ZOSTER VACCINE AGE 60> 5/20/2002 GLAUCOMA SCREENING Q2Y 7/20/2007 OSTEOPOROSIS SCREENING (DEXA) 7/20/2007 Pneumococcal 65+ High/Highest Risk (1 of 2 - PCV13) 7/20/2007 MEDICARE YEARLY EXAM 7/20/2007 INFLUENZA AGE 9 TO ADULT 8/1/2017 Allergies as of 10/25/2017  Review Complete On: 10/25/2017 By: Chanelle Guevara MD  
 No Known Allergies Current Immunizations  Never Reviewed No immunizations on file. Not reviewed this visit You Were Diagnosed With   
  
 Codes Comments Personal history of breast cancer    -  Primary ICD-10-CM: Z85.3 ICD-9-CM: V10.3 Vitals BP Resp OB Status Smoking Status 122/82 18 Hysterectomy Never Smoker Vitals History [phone] [facility] [place], [address] AT [address] [phone] Your Updated Medication List  
  
   
This list is accurate as of: 10/25/17  2:03 PM.  Always use your most recent med list.  
  
  
  
  
 ALPRAZolam 0.5 mg tablet Commonly known as:  Kal Fine TK 1 T PO ONCE A DAY FOR 30 DAYS  
  
 amLODIPine 2.5 mg tablet Commonly known as:  Elspeth Bakes TK 1 T PO  ONCE A DAY  
  
 aspirin delayed-release 81 mg tablet Take 81 mg by mouth daily. atorvastatin 40 mg tablet Commonly known as:  LIPITOR TK 1 T PO  ONCE A DAY  
  
 clopidogrel 75 mg Tab Commonly known as:  PLAVIX TK 1 T PO QD  
  
 levothyroxine 25 mcg tablet Commonly known as:  SYNTHROID TK 1 T PO QD IN THE MORNING OES  
  
 oxyCODONE-acetaminophen 5-325 mg per tablet Commonly known as:  PERCOCET Take 1 Tab by mouth every six (6) hours as needed for Pain. Max Daily Amount: 4 Tabs. tamoxifen 20 mg tablet Commonly known as:  NOLVADEX Take 1 Tab by mouth daily. Prescriptions Sent to Pharmacy Refills  
 tamoxifen (NOLVADEX) 20 mg tablet 1 Sig: Take 1 Tab by mouth daily. Class: Normal  
 Pharmacy: [facility] [address] [place] [address] AT [address]  #: [phone] Route: Oral  
  
Follow-up Instructions Return in about 6 months (around 4/25/2018). Introducing [facility] & HEALTH SERVICES! Karen Vidal introduces [email or web address] patient portal. Now you can access parts of your medical record, email your doctor's office, and request medication refills online. 1. In your internet browser, go to [email or web address] 2. Click on the First Time User? Click Here link in the Sign In box. You will see the New Member Sign Up page. 3. Enter your [email or web address] Access Code exactly as it appears below. You will not need to use this code after youve completed the sign-up process. If you do not sign up before the expiration date, you must request a new code. · [email or web address] Access Code: [facility] Expires: 12/28/2017  2:50 PM 
 
4. Enter the last four digits of your Social Security Number (xxxx) and Date of Birth (mm/dd/yyyy) as indicated and click Submit. You will be taken to the next sign-up page. 5. Create a Chujiant ID.  This will be your [email or web address] login ID and cannot be changed, so think of one that is secure and easy to remember. 6. Create a GdeSlon password. You can change your password at any time. 7. Enter your Password Reset Question and Answer. This can be used at a later time if you forget your password. 8. Enter your e-mail address. You will receive e-mail notification when new information is available in [address] 9. Click Sign Up. You can now view and download portions of your medical record. 10. Click the Download Summary menu link to download a portable copy of your medical information. If you have questions, please visit the Frequently Asked Questions section of the GdeSlon website. Remember, GdeSlon is NOT to be used for urgent needs. For medical emergencies, dial 911. Now available from your iPhone and Android! Please provide this summary of care documentation to your next provider. Your primary care clinician is listed as [facility]. If you have any questions after today's visit, please call [phone].

## 2017-10-25 NOTE — PROGRESS NOTES
Progress Note    Patient: Mandi Nelson  MRN: [id number]  SSN: [id number]   YOB: 1940  Age: 78 y.o. Sex: female     Chief Complaint   Patient presents with    Follow-up       HPI    Ms. Anna Alex is a 63-year-old lady status post a left mastectomy for a T2 N0 M0 left breast cancer last year. She is done well since then and now has a negative right mammogram.  We will see her back in 6 months for a wound check and in a year for a right mammogram    Past Medical History:   Diagnosis Date    Breast CA ([address] 2017    left     High cholesterol     Hypertension     Thyroid disease     TIA (transient ischemic attack)     approx 8 years ago     Past Surgical History:   Procedure Laterality Date    HX HEENT Left     retina surgery    HX HYSTERECTOMY      IN MASTECTOMY, SIMPLE, COMPLETE Left 02/21/2017    Dr. Marcos Nolen     No Known Allergies  Current Outpatient Prescriptions   Medication Sig Dispense Refill    tamoxifen (NOLVADEX) 10 mg tablet Take 20 mg by mouth daily.  oxyCODONE-acetaminophen (PERCOCET) 5-325 mg per tablet Take 1 Tab by mouth every six (6) hours as needed for Pain. Max Daily Amount: 4 Tabs. 30 Tab 0    aspirin delayed-release 81 mg tablet Take 81 mg by mouth daily.  ALPRAZolam (XANAX) 0.5 mg tablet TK 1 T PO ONCE A DAY FOR 30 DAYS  0    amLODIPine (NORVASC) 2.5 mg tablet TK 1 T PO  ONCE A DAY  3    atorvastatin (LIPITOR) 40 mg tablet TK 1 T PO  ONCE A DAY  2    clopidogrel (PLAVIX) 75 mg tab TK 1 T PO QD  2    levothyroxine (SYNTHROID) 25 mcg tablet TK 1 T PO QD IN THE MORNING OES  2     Social History     Social History    Marital status:      Spouse name: N/A    Number of children: N/A    Years of education: N/A     Occupational History    Not on file.      Social History Main Topics    Smoking status: Never Smoker    Smokeless tobacco: Never Used    Alcohol use Yes      Comment: occasionally    Drug use: No    Sexual activity: Not on file     Other Topics Concern    Not on file     Social History Narrative     Family History   Problem Relation Age of Onset    Cancer Mother 39     Throat         Review of systems:  Patient denies any reflux, emesis, abdominal pain, change in bowel habits, hematochezia, melena, fever, weight loss, fatigue chills, dermatitis, abnormal moles, change in vision, vertigo, epistaxis, dysphagia, hoarseness, chest pain, palpitations, hypertension, edema, cough, shortness of breath, wheezing, hemoptysis, snoring, hematuria, diabetes, thyroid disease, anemia, bruising, history of blood transfusion, dizziness, headache, or fainting. Physical Examination    Well developed well nourished female in no apparent distress  Visit Vitals    /82    Resp 18      Head: normocephalic, atraumatic  Mouth: Clear, no overt lesions, oral mucosa pink and moist  Neck: supple, no masses, no adenopathy or carotid bruits, trachea midline  Resp: clear to auscultation bilaterally, no wheeze, rhonchi or rales, excursions normal and symmetrical  Cardio: Regular rate and rhythm, no murmurs, clicks, gallops or rubs, no edema or varicosities  Abdomen: soft, nontender, nondistended, normoactive bowel sounds, no hernias, no hepatosplenomegaly,   Back: Deferred  Extremeties: warm, well-perfused, no tenderness or swelling, normal gait/station  Neuro: sensation and strength grossly intact and symmetrical  Psych: alert and oriented to person, place and time  Breast exam left breast mastectomy site without evidence of recurrence    IMPRESSION  History of the above left breast tumor normal mammogram    PLAN  No orders of the defined types were placed in this encounter.     Follow-up in 6 months  Paulette Simmons MD

## 2018-04-18 ENCOUNTER — OFFICE VISIT (OUTPATIENT)
Dept: SURGERY | Age: 78
End: 2018-04-18

## 2018-04-18 VITALS — WEIGHT: 116 LBS | BODY MASS INDEX: 22.78 KG/M2 | RESPIRATION RATE: 16 BRPM | HEIGHT: 60 IN | OXYGEN SATURATION: 97 %

## 2018-04-18 DIAGNOSIS — Z85.3 PERSONAL HISTORY OF BREAST CANCER: Primary | ICD-10-CM

## 2018-04-18 RX ORDER — TAMOXIFEN CITRATE 20 MG/1
20 TABLET ORAL DAILY
Qty: 90 TAB | Refills: 3 | Status: SHIPPED | OUTPATIENT
Start: 2018-04-18 | End: 2019-04-22 | Stop reason: SDUPTHER

## 2018-04-18 NOTE — MR AVS SNAPSHOT
[address] [facility] 
 
 
 [address] [facility] [address] [facility] [address] 
[phone] Patient: Paula Ohara MRN: [id number] WVD:[id number] Visit Information Date & Time Provider Department Dept. Phone Encounter #  
 4/18/2018  2:30 PM [id number] Your Appointments 10/31/2018 11:30 AM  
Follow Up with Birgit Merlos MD  
[address] [facility]) Appt Note: f/u after mammo [address] [facility] [address] [facility] [address]  
[phone]  
  
   
 [address] [facility] [address] [address] Upcoming Health Maintenance Date Due DTaP/Tdap/Td series (1 - Tdap) 7/20/1963 ZOSTER VACCINE AGE 60> 5/20/2002 GLAUCOMA SCREENING Q2Y 7/20/2007 Bone Densitometry (Dexa) Screening 7/20/2007 Pneumococcal 65+ High/Highest Risk (1 of 2 - PCV13) 7/20/2007 Influenza Age 5 to Adult 8/1/2017 MEDICARE YEARLY EXAM 3/14/2018 Allergies as of 4/18/2018  Review Complete On: 4/18/2018 By: Birgit Merlos MD  
 No Known Allergies Current Immunizations  Never Reviewed No immunizations on file. Not reviewed this visit You Were Diagnosed With   
  
 Codes Comments Personal history of breast cancer    -  Primary ICD-10-CM: Z85.3 ICD-9-CM: V10.3 Vitals Resp Height(growth percentile) Weight(growth percentile) SpO2 BMI OB Status 16 4' 11.5\" (1.511 m) 116 lb (52.6 kg) 97% 23.04 kg/m2 Hysterectomy Smoking Status Never Smoker BMI and BSA Data Body Mass Index Body Surface Area 23.04 kg/m 2 1.49 m 2 Preferred Pharmacy Pharmacy Name Phone [phone] - [address], [address] [facility] [address] AT [address] [phone] Your Updated Medication List  
  
   
 This list is accurate as of 4/18/18  3:06 PM.  Always use your most recent med list.  
  
  
  
  
 ALPRAZolam 0.5 mg tablet Commonly known as:  Peola Surya TK 1 T PO ONCE A DAY FOR 30 DAYS  
  
 amLODIPine 2.5 mg tablet Commonly known as:  Sherrye Acron TK 1 T PO  ONCE A DAY  
  
 aspirin delayed-release 81 mg tablet Take 81 mg by mouth daily. atorvastatin 40 mg tablet Commonly known as:  LIPITOR TK 1 T PO  ONCE A DAY  
  
 clopidogrel 75 mg Tab Commonly known as:  PLAVIX TK 1 T PO QD  
  
 levothyroxine 25 mcg tablet Commonly known as:  SYNTHROID TK 1 T PO QD IN THE MORNING OES  
  
 oxyCODONE-acetaminophen 5-325 mg per tablet Commonly known as:  PERCOCET Take 1 Tab by mouth every six (6) hours as needed for Pain. Max Daily Amount: 4 Tabs. tamoxifen 20 mg tablet Commonly known as:  NOLVADEX Take 1 Tab by mouth daily. Prescriptions Sent to Pharmacy Refills  
 tamoxifen (NOLVADEX) 20 mg tablet 3 Sig: Take 1 Tab by mouth daily. Class: Normal  
 Pharmacy: [facility] [address] [place] [address] AT [address] Ph #: [phone] Route: Oral  
  
To-Do List   
 10/01/2018 Imaging:  AISHA MAMMO RT DX INCL CAD Introducing [facility] & HEALTH SERVICES! Glendy Higgins introduces PowerSmart patient portal. Now you can access parts of your medical record, email your doctor's office, and request medication refills online. 1. In your internet browser, go to [email or web address] 2. Click on the First Time User? Click Here link in the Sign In box. You will see the New Member Sign Up page. 3. Enter your PowerSmart Access Code exactly as it appears below. You will not need to use this code after youve completed the sign-up process. If you do not sign up before the expiration date, you must request a new code. · PowerSmart Access Code: [email or web address] Expires: 7/17/2018  2:58 PM 
 
 4. Enter the last four digits of your Social Security Number (xxxx) and Date of Birth (mm/dd/yyyy) as indicated and click Submit. You will be taken to the next sign-up page. 5. Create a TopCoder ID. This will be your TopCoder login ID and cannot be changed, so think of one that is secure and easy to remember. 6. Create a TopCoder password. You can change your password at any time. 7. Enter your Password Reset Question and Answer. This can be used at a later time if you forget your password. 8. Enter your e-mail address. You will receive e-mail notification when new information is available in [address] 9. Click Sign Up. You can now view and download portions of your medical record. 10. Click the Download Summary menu link to download a portable copy of your medical information. If you have questions, please visit the Frequently Asked Questions section of the TopCoder website. Remember, TopCoder is NOT to be used for urgent needs. For medical emergencies, dial 911. Now available from your iPhone and Android! Please provide this summary of care documentation to your next provider. Your primary care clinician is listed as Roberta Self. If you have any questions after today's visit, please call [phone].

## 2018-04-18 NOTE — PROGRESS NOTES
Progress Note    Patient: Pari Sneed  MRN: [id number]  SSN: [id number]   YOB: 1940  Age: 78 y.o. Sex: female     Chief Complaint   Patient presents with    Follow-up     wound check       HPI    Ms Adarsh Vail is a 44-year-old lady who had a left mastectomy for a T2 N0 M0 lesion over a years ago. She is back today for a wound check and looks good she has no breast health complaints. She continues on her tamoxifen. Her right breast today is a normal exam    Past Medical History:   Diagnosis Date    Breast CA ([address] 2017    left     High cholesterol     Hypertension     Thyroid disease     TIA (transient ischemic attack)     approx 8 years ago     Past Surgical History:   Procedure Laterality Date    HX HEENT Left     retina surgery    HX HYSTERECTOMY      CA MASTECTOMY, SIMPLE, COMPLETE Left 02/21/2017    Dr. Hallie Yun     No Known Allergies  Current Outpatient Prescriptions   Medication Sig Dispense Refill    tamoxifen (NOLVADEX) 20 mg tablet Take 1 Tab by mouth daily. 90 Tab 3    aspirin delayed-release 81 mg tablet Take 81 mg by mouth daily.  ALPRAZolam (XANAX) 0.5 mg tablet TK 1 T PO ONCE A DAY FOR 30 DAYS  0    amLODIPine (NORVASC) 2.5 mg tablet TK 1 T PO  ONCE A DAY  3    atorvastatin (LIPITOR) 40 mg tablet TK 1 T PO  ONCE A DAY  2    clopidogrel (PLAVIX) 75 mg tab TK 1 T PO QD  2    levothyroxine (SYNTHROID) 25 mcg tablet TK 1 T PO QD IN THE MORNING OES  2    oxyCODONE-acetaminophen (PERCOCET) 5-325 mg per tablet Take 1 Tab by mouth every six (6) hours as needed for Pain. Max Daily Amount: 4 Tabs. 30 Tab 0     Social History     Social History    Marital status:      Spouse name: N/A    Number of children: N/A    Years of education: N/A     Occupational History    Not on file.      Social History Main Topics    Smoking status: Never Smoker    Smokeless tobacco: Never Used    Alcohol use Yes      Comment: occasionally    Drug use: No    Sexual activity: Not on file     Other Topics Concern    Not on file     Social History Narrative     Family History   Problem Relation Age of Onset    Cancer Mother 39     Throat         Review of systems:  Patient denies any reflux, emesis, abdominal pain, change in bowel habits, hematochezia, melena, fever, weight loss, fatigue chills, dermatitis, abnormal moles, change in vision, vertigo, epistaxis, dysphagia, hoarseness, chest pain, palpitations, hypertension, edema, cough, shortness of breath, wheezing, hemoptysis, snoring, hematuria, diabetes, thyroid disease, anemia, bruising, history of blood transfusion, dizziness, headache, or fainting. Physical Examination    Well developed well nourished female in no apparent distress  Visit Vitals    Resp 16    Ht 4' 11.5\" (1.511 m)    Wt 52.6 kg (116 lb)    SpO2 97%    BMI 23.04 kg/m2      Head: normocephalic, atraumatic  Mouth: Clear, no overt lesions, oral mucosa pink and moist  Neck: supple, no masses, no adenopathy or carotid bruits, trachea midline  Resp: clear to auscultation bilaterally, no wheeze, rhonchi or rales, excursions normal and symmetrical  Cardio: Regular rate and rhythm, no murmurs, clicks, gallops or rubs, no edema or varicosities  Abdomen: soft, nontender, nondistended, normoactive bowel sounds, no hernias, no hepatosplenomegaly,   Back: Deferred  Extremeties: warm, well-perfused, no tenderness or swelling, normal gait/station  Neuro: sensation and strength grossly intact and symmetrical  Psych: alert and oriented to person, place and time  Breast exam no obvious recurrence in the left mastectomy wound.   Normal right breast exam    IMPRESSION  History of early stage left breast cancer    PLAN  Orders Placed This Encounter    DIAGNOSTIC MAMMOGRAM RT (SYMPTOMS)     Standing Status:   Future     Standing Expiration Date:   7/17/2024     Order Specific Question:   Reason for Exam     Answer:   History left breast cancer    tamoxifen (NOLVADEX) 20 mg tablet Sig: Take 1 Tab by mouth daily.      Dispense:  90 Tab     Refill:  3     Low up in 6 months with right mammogram  Georgina Sewell MD

## 2018-05-17 RX ORDER — BISMUTH SUBSALICYLATE 262 MG
1 TABLET,CHEWABLE ORAL DAILY
COMMUNITY

## 2018-06-04 ENCOUNTER — ANESTHESIA EVENT (OUTPATIENT)
Dept: ENDOSCOPY | Age: 78
End: 2018-06-04
Payer: MEDICARE

## 2018-06-05 ENCOUNTER — ANESTHESIA (OUTPATIENT)
Dept: ENDOSCOPY | Age: 78
End: 2018-06-05
Payer: MEDICARE

## 2018-06-05 ENCOUNTER — HOSPITAL ENCOUNTER (OUTPATIENT)
Age: 78
Setting detail: OUTPATIENT SURGERY
Discharge: HOME | End: 2018-06-05
Attending: INTERNAL MEDICINE | Admitting: INTERNAL MEDICINE
Payer: MEDICARE

## 2018-06-05 VITALS
SYSTOLIC BLOOD PRESSURE: 136 MMHG | TEMPERATURE: 97.7 F | RESPIRATION RATE: 10 BRPM | HEART RATE: 55 BPM | WEIGHT: 119 LBS | HEIGHT: 60 IN | OXYGEN SATURATION: 100 % | BODY MASS INDEX: 23.36 KG/M2 | DIASTOLIC BLOOD PRESSURE: 59 MMHG

## 2018-06-05 PROCEDURE — 74011250636 HC RX REV CODE- 250/636: Performed by: ANESTHESIOLOGY

## 2018-06-05 PROCEDURE — 74011250636 HC RX REV CODE- 250/636

## 2018-06-05 PROCEDURE — 76060000031 HC ANESTHESIA FIRST 0.5 HR: Performed by: INTERNAL MEDICINE

## 2018-06-05 PROCEDURE — 76040000019: Performed by: INTERNAL MEDICINE

## 2018-06-05 RX ORDER — DEXTROMETHORPHAN/PSEUDOEPHED 2.5-7.5/.8
1.2 DROPS ORAL
Status: CANCELLED | OUTPATIENT
Start: 2018-06-05

## 2018-06-05 RX ORDER — EPINEPHRINE 0.1 MG/ML
1 INJECTION INTRACARDIAC; INTRAVENOUS
Status: CANCELLED | OUTPATIENT
Start: 2018-06-05 | End: 2018-06-05

## 2018-06-05 RX ORDER — SODIUM CHLORIDE 0.9 % (FLUSH) 0.9 %
5-10 SYRINGE (ML) INJECTION EVERY 8 HOURS
Status: DISCONTINUED | OUTPATIENT
Start: 2018-06-05 | End: 2018-06-05 | Stop reason: HOSPADM

## 2018-06-05 RX ORDER — NALOXONE HYDROCHLORIDE 0.4 MG/ML
0.4 INJECTION, SOLUTION INTRAMUSCULAR; INTRAVENOUS; SUBCUTANEOUS
Status: CANCELLED | OUTPATIENT
Start: 2018-06-05 | End: 2018-06-05

## 2018-06-05 RX ORDER — ATROPINE SULFATE 0.1 MG/ML
0.5 INJECTION INTRAVENOUS
Status: CANCELLED | OUTPATIENT
Start: 2018-06-05 | End: 2018-06-05

## 2018-06-05 RX ORDER — LIDOCAINE HYDROCHLORIDE 10 MG/ML
0.1 INJECTION, SOLUTION EPIDURAL; INFILTRATION; INTRACAUDAL; PERINEURAL AS NEEDED
Status: DISCONTINUED | OUTPATIENT
Start: 2018-06-05 | End: 2018-06-05 | Stop reason: HOSPADM

## 2018-06-05 RX ORDER — PROPOFOL 10 MG/ML
INJECTION, EMULSION INTRAVENOUS AS NEEDED
Status: DISCONTINUED | OUTPATIENT
Start: 2018-06-05 | End: 2018-06-05 | Stop reason: HOSPADM

## 2018-06-05 RX ORDER — SODIUM CHLORIDE 0.9 % (FLUSH) 0.9 %
5-10 SYRINGE (ML) INJECTION EVERY 8 HOURS
Status: CANCELLED | OUTPATIENT
Start: 2018-06-05 | End: 2018-06-05

## 2018-06-05 RX ORDER — SODIUM CHLORIDE, SODIUM LACTATE, POTASSIUM CHLORIDE, CALCIUM CHLORIDE 600; 310; 30; 20 MG/100ML; MG/100ML; MG/100ML; MG/100ML
INJECTION, SOLUTION INTRAVENOUS
Status: DISCONTINUED | OUTPATIENT
Start: 2018-06-05 | End: 2018-06-05 | Stop reason: HOSPADM

## 2018-06-05 RX ORDER — SODIUM CHLORIDE, SODIUM LACTATE, POTASSIUM CHLORIDE, CALCIUM CHLORIDE 600; 310; 30; 20 MG/100ML; MG/100ML; MG/100ML; MG/100ML
75 INJECTION, SOLUTION INTRAVENOUS CONTINUOUS
Status: DISCONTINUED | OUTPATIENT
Start: 2018-06-05 | End: 2018-06-05 | Stop reason: HOSPADM

## 2018-06-05 RX ORDER — FLUMAZENIL 0.1 MG/ML
0.2 INJECTION INTRAVENOUS
Status: CANCELLED | OUTPATIENT
Start: 2018-06-05 | End: 2018-06-05

## 2018-06-05 RX ORDER — SODIUM CHLORIDE 0.9 % (FLUSH) 0.9 %
5-10 SYRINGE (ML) INJECTION AS NEEDED
Status: CANCELLED | OUTPATIENT
Start: 2018-06-05 | End: 2018-06-05

## 2018-06-05 RX ORDER — SODIUM CHLORIDE 0.9 % (FLUSH) 0.9 %
5-10 SYRINGE (ML) INJECTION AS NEEDED
Status: DISCONTINUED | OUTPATIENT
Start: 2018-06-05 | End: 2018-06-05 | Stop reason: HOSPADM

## 2018-06-05 RX ADMIN — SODIUM CHLORIDE, SODIUM LACTATE, POTASSIUM CHLORIDE, CALCIUM CHLORIDE: 600; 310; 30; 20 INJECTION, SOLUTION INTRAVENOUS at 08:25

## 2018-06-05 RX ADMIN — PROPOFOL 50 MG: 10 INJECTION, EMULSION INTRAVENOUS at 08:32

## 2018-06-05 RX ADMIN — PROPOFOL 100 MG: 10 INJECTION, EMULSION INTRAVENOUS at 08:25

## 2018-06-05 NOTE — ANESTHESIA PREPROCEDURE EVALUATION
Anesthetic History   No history of anesthetic complications            Review of Systems / Medical History  Patient summary reviewed and pertinent labs reviewed    Pulmonary        Sleep apnea: CPAP           Neuro/Psych       CVA  TIA     Cardiovascular    Hypertension                   GI/Hepatic/Renal  Within defined limits              Endo/Other      Hypothyroidism  Cancer     Other Findings   Comments: Documentation of current medication  Current medications obtained, documented and obtained? YES      Risk Factors for Postoperative nausea/vomiting:       History of postoperative nausea/vomiting? NO       Female? YES       Motion sickness? NO       Intended opioid administration for postoperative analgesia? NO      Smoking Abstinence:  Current Smoker? NO  Elective Surgery? YES  Seen preoperatively by anesthesiologist or proxy prior to day of surgery? YES  Pt abstained from smoking 24 hours prior to anesthesia?  N/A    Preventive care/screening for High Blood Pressure:  Aged 18 years and older: YES  Screened for high blood pressure: YES  Patients with high blood pressure referred to primary care provider   for BP management: YES                 Physical Exam    Airway  Mallampati: II  TM Distance: 4 - 6 cm  Neck ROM: normal range of motion   Mouth opening: Normal     Cardiovascular  Regular rate and rhythm,  S1 and S2 normal,  no murmur, click, rub, or gallop             Dental  No notable dental hx       Pulmonary  Breath sounds clear to auscultation               Abdominal  GI exam deferred       Other Findings            Anesthetic Plan    ASA: 3  Anesthesia type: MAC          Induction: Intravenous  Anesthetic plan and risks discussed with: Patient

## 2018-06-05 NOTE — DISCHARGE INSTRUCTIONS
DISCHARGE SUMMARY from Nurse     POST-PROCEDURE INSTRUCTIONS:    Call your Physician if you:  ? Observe any excess bleeding. ? Develop a temperature over 100.5o F.  ? Experience abdominal, shoulder or chest pain. ? Notice any signs of decreased circulation or nerve impairment to an extremity such as a change in color, persistent numbness, tingling, coldness or increase in pain. ? Vomit blood or you have nausea and vomiting lasting longer than 4 hours. ? Are unable to take medications. ? Are unable to urinate within 8 hours after discharge following general anesthesia or intravenous sedation. For the next 24 hours after receiving general anesthesia or intravenous sedation, or while taking prescription Narcotics, limit your activities:  ? Do NOT drive a motor vehicle, operate hazard machinery or power tools, or perform tasks that require coordination. The medication you received during your procedure may have some effect on your mental awareness. ? Do NOT make important personal or business decisions. The medication you received during your procedure may have some effect on your mental awareness. ? Do NOT drink alcoholic beverages. These drinks do not mix well with the medications that have been given to you. ? Upon discharge from the hospital, you must be accompanied by a responsible adult. ? Resume your diet as directed by your physician. ? Resume medications as your physician has prescribed. ? Please give a list of your current medications to your Primary Care Provider. ? Please update this list whenever your medications are discontinued, doses are changed, or new medications (including over-the-counter products) are added. ? Please carry medication information at all times in case of emergency situations. These are general instructions for a healthy lifestyle:    No smoking/ No tobacco products/ Avoid exposure to second hand smoke.    Surgeon General's Warning:  Quitting smoking now greatly reduces serious risk to your health. Obesity, smoking, and a sedentary lifestyle greatly increase your risk for illness.  A healthy diet, regular physical exercise & weight monitoring are important for maintaining a healthy lifestyle   You may be retaining fluid if you have a history of heart failure or if you experience any of the following symptoms:  Weight gain of 3 pounds or more overnight or 5 pounds in a week, increased swelling in our hands or feet or shortness of breath while lying flat in bed. Please call your doctor as soon as you notice any of these symptoms; do not wait until your next office visit. Recognize signs and symptoms of STROKE:  F  -  Face looks uneven  A  -  Arms unable to move or move unevenly  S  -  Speech slurred or non-existent  T  -  Time to call 911 - as soon as signs and symptoms begin - DO NOT go back to bed or wait to see If you get better - TIME IS BRAIN. Colorectal Screening   Colorectal cancer almost always develops from precancerous polyps (abnormal growths) in the colon or rectum. Screening tests can find precancerous polyps, so that they can be removed before they turn into cancer. Screening tests can also find colorectal cancer early, when treatment works best.  Hipolito Regan Speak with your physician about when you should begin screening and how often you should be tested. Colonoscopy: What to Expect at [address] [place] [address]  After you have a colonoscopy, you will stay at the clinic for 1 to 2 hours until the medicines wear off. Then you can go home. But you will need to arrange for a ride. Your doctor will tell you when you can eat and do your other usual activities. Your doctor will talk to you about when you will need your next colonoscopy. Your doctor can help you decide how often you need to be checked. This will depend on the results of your test and your risk for colorectal cancer. After the test, you may be bloated or have gas pains.  You may need to pass gas. If a biopsy was done or a polyp was removed, you may have streaks of blood in your stool (feces) for a few days. This care sheet gives you a general idea about how long it will take for you to recover. But each person recovers at a different pace. Follow the steps below to get better as quickly as possible. How can you care for yourself at home? Activity  · Rest when you feel tired. · You can do your normal activities when it feels okay to do so. Diet  · Follow your doctor's directions for eating. · Unless your doctor has told you not to, drink plenty of fluids. This helps to replace the fluids that were lost during the colon prep. · Do not drink alcohol. Medicines  · If polyps were removed or a biopsy was done during the test, your doctor may tell you not to take aspirin or other anti-inflammatory medicines for a few days. These include ibuprofen (Advil, Motrin) and naproxen (Aleve). Other instructions  · For your safety, do not drive or operate machinery until the medicine wears off and you can think clearly. Your doctor may tell you not to drive or operate machinery until the day after your test.  · Do not sign legal documents or make major decisions until the medicine wears off and you can think clearly. The anesthesia can make it hard for you to fully understand what you are agreeing to. Follow-up care is a key part of your treatment and safety. Be sure to make and go to all appointments, and call your doctor if you are having problems. It's also a good idea to know your test results and keep a list of the medicines you take. When should you call for help? Call 911 anytime you think you may need emergency care. For example, call if:  · You passed out (lost consciousness). · You pass maroon or bloody stools. · You have severe belly pain. Call your doctor now or seek immediate medical care if:  · Your stools are black and tarlike.   · Your stools have streaks of blood, but you did not have a biopsy or any polyps removed. · You have belly pain, or your belly is swollen and firm. · You vomit. · You have a fever. · You are very dizzy. Watch closely for changes in your health, and be sure to contact your doctor if you have any problems. Where can you learn more? Go to [employer]  Enter E264 in the search box to learn more about \"Colonoscopy: What to Expect at Home. \"   © 6640-1684 [employer]. Care instructions adapted under license by [facility] (which disclaims liability or warranty for this information). This care instruction is for use with your licensed healthcare professional. If you have questions about a medical condition or this instruction, always ask your healthcare professional. [address] any warranty or liability for your use of this information. Content Version: 10.1.883659; Current as of: November 14, 2014           Diverticulosis: Care Instructions  Your Care Instructions  In diverticulosis, pouches called diverticula form in the wall of the large intestine (colon). The pouches do not cause any pain or other symptoms. Most people who have diverticulosis do not know they have it. But the pouches sometimes bleed, and if they become infected, they can cause pain and other symptoms. When this happens, it is called diverticulitis. Diverticula form when pressure pushes the wall of the colon outward at certain weak points. A diet that is too low in fiber can cause diverticula. Follow-up care is a key part of your treatment and safety. Be sure to make and go to all appointments, and call your doctor if you are having problems. It's also a good idea to know your test results and keep a list of the medicines you take. How can you care for yourself at home? · Include fruits, leafy green vegetables, beans, and whole grains in your diet each day. These foods are high in fiber.   · Take a fiber supplement, such as Citrucel or Metamucil, every day if needed. Read and follow all instructions on the label. · Drink plenty of fluids, enough so that your urine is light yellow or clear like water. If you have kidney, heart, or liver disease and have to limit fluids, talk with your doctor before you increase the amount of fluids you drink. · Get at least 30 minutes of exercise on most days of the week. Walking is a good choice. You also may want to do other activities, such as running, swimming, cycling, or playing tennis or team sports. · Cut out foods that cause gas, pain, or other symptoms. When should you call for help? Call your doctor now or seek immediate medical care if:  ? · You have belly pain. ? · You pass maroon or very bloody stools. ? · You have a fever. ? · You have nausea and vomiting. ? · You have unusual changes in your bowel movements or abdominal swelling. ? · You have burning pain when you urinate. ? · You have abnormal vaginal discharge. ? · You have shoulder pain. ? · You have cramping pain that does not get better when you have a bowel movement or pass gas. ? · You pass gas or stool from your urethra while urinating. ? Watch closely for changes in your health, and be sure to contact your doctor if you have any problems. Where can you learn more? Go to http://rubin-jaqueline.info/. Enter U950 in the search box to learn more about \"Diverticulosis: Care Instructions. \"  Current as of: May 12, 2017  Content Version: 11.4  © 7405-1339 [employer]. Care instructions adapted under license by [employer] (which disclaims liability or warranty for this information). If you have questions about a medical condition or this instruction, always ask your healthcare professional. [address] any warranty or liability for your use of this information.

## 2018-06-05 NOTE — ANESTHESIA POSTPROCEDURE EVALUATION
Post-Anesthesia Evaluation & Assessment    Visit Vitals    /60    Pulse 62    Temp 36.5 °C (97.7 °F)    Resp 17    Ht 4' 11.5\" (1.511 m)    Wt 54 kg (119 lb)    SpO2 100%    Breastfeeding No    BMI 23.63 kg/m2       Post-operative hydration adequate. Pain score (VAS): 0 Pain Scale 1: Numeric (0 - 10) (06/05/18 0850)  Pain Intensity 1: 0 (06/05/18 0850)   Managed. Mental status & Level of consciousness: returned to baseline    Neurological status: returned to baseline    Pulmonary status: airway patent, oxygen given as needed. Complications related to anesthesia: none    Patient has met all discharge requirements.     Additional comments:        Malgorzata Horne MD  June 5, 2018

## 2018-06-05 NOTE — H&P
[facility] [place], [place]   [email or web address]      Impression:   1. CRCS      Plan:     1. [place] with MAC      Chief Complaint: CRCS      HPI:  Paula Ohara is a 78 y.o. female who is being seen preop for CRCS. Last colo was 2008. Eneida Solano PMH:   Past Medical History:   Diagnosis Date    Breast CA ([address] 2017    left     High cholesterol     Hypertension     Sleep apnea     cpap    Thyroid disease     TIA (transient ischemic attack)     approx 8 years ago       PSH:   Past Surgical History:   Procedure Laterality Date    HX HEENT Left     retina surgery    HX HYSTERECTOMY      ME MASTECTOMY, SIMPLE, COMPLETE Left 02/21/2017    Dr. Jorge De La Fuente HX:   Social History     Social History    Marital status:      Spouse name: N/A    Number of children: N/A    Years of education: N/A     Occupational History    Not on file. Social History Main Topics    Smoking status: Former Smoker    Smokeless tobacco: Never Used    Alcohol use Yes      Comment: occasionally    Drug use: No    Sexual activity: Not on file     Other Topics Concern    Not on file     Social History Narrative       FHX:   Family History   Problem Relation Age of Onset    Cancer Mother 39     Throat       Allergy:   No Known Allergies    Home Medications:     Prescriptions Prior to Admission   Medication Sig    multivitamin (ONE A DAY) tablet Take 1 Tab by mouth daily.  fish oil-omega-3 fatty acids 340-1,000 mg capsule Take 1 Cap by mouth two (2) times a week. Indications: takes 1200 2 times weekly    calcium-cholecalciferol, d3, (CALCIUM 600 + D) 600-125 mg-unit tab Take 1,200 mg by mouth.  tamoxifen (NOLVADEX) 20 mg tablet Take 1 Tab by mouth daily.  oxyCODONE-acetaminophen (PERCOCET) 5-325 mg per tablet Take 1 Tab by mouth every six (6) hours as needed for Pain. Max Daily Amount: 4 Tabs.  aspirin delayed-release 81 mg tablet Take 81 mg by mouth daily.     ALPRAZolam (XANAX) 0.5 mg tablet TK 1 T PO ONCE A DAY FOR 30 DAYS    amLODIPine (NORVASC) 2.5 mg tablet TK 1 T PO  ONCE A DAY    atorvastatin (LIPITOR) 40 mg tablet TK 1 T PO  ONCE A DAY    levothyroxine (SYNTHROID) 25 mcg tablet TK 1 T PO QD IN THE MORNING OES    clopidogrel (PLAVIX) 75 mg tab TK 1 T PO QD       Review of Systems:     Constitutional: No fevers, chills, weight loss, fatigue. Cardiovascular: No chest pain, heart palpitations. Respiratory: No cough, SOB, wheezing, chest discomfort, orthopnea. Gastrointestinal: No GI or oabdominal complaints       Musculoskeletal: No weakness, arthralgias, wasting. Allergies: As noted. Visit Vitals    Ht 4' 11\" (1.499 m)    Wt 54.4 kg (120 lb)    Breastfeeding No    BMI 24.24 kg/m2       Physical Assessment:     constitutional: appearance: well developed, well nourished, normal habitus, no deformities, in no acute distress. ENMT: mouth: normal oral mucosa,lips and gums; good dentition. oropharynx: normal tongue, hard and soft palate; posterior pharynx without erithema, exudate or lesions. respiratory: effort: normal chest excursion; no intercostal retraction or accessory muscle use. cardiovascular: abdominal aorta: normal size and position; no bruits. palpation: PMI of normal size and position; normal rhythm; no thrill or murmurs. abdominal: abdomen: normal consistency; no tenderness or masses. hernias: no hernias appreciated. liver: normal size and consistency. spleen: not palpable. rectal: hemoccult/guaiac: not performed. musculoskeletal: digits and nails: no clubbing, cyanosis, petechiae or other inflammatory conditions. psychiatric: orientation: oriented to time, space and person. Sandrita Hunter MD, MFOX. [facility], [address] [place] [address]  Pager [phone]  [email or web address]

## 2018-06-05 NOTE — PROCEDURES
Katie  [facility], [address]      Brief Procedure Note    Jayne Dennison  1940  [id number]    Date of Procedure: 6/5/2018    Preoperative diagnosis: V76.51 - Z12.11,  Colon cancer Screening  564.00 - K59.00,  Constipation  569.3 - K62.5,  Rectal bleeding    Postoperative diagnosis:  Diverticulosis    Type of Anesthesia: MAC (monitered anesthesia care)    Description of Findings: same as post op dx    Procedure: Procedure(s):  COLONOSCOPY    :  Dr. Isabel Tony MD    Assistant(s): [unfilled]    Type of Anesthesia:MAC     EBL:None    Specimens: * No specimens in log *    Findings: See printed and scanned procedure note    Complications: None    Dr. Isabel Tony MD  6/5/2018  8:38 AM

## 2018-06-05 NOTE — IP AVS SNAPSHOT
Ivan Barton 
 
 
 [address] [address] [address] [address] [phone] Patient: Ale Aviles MRN: [id number] AIB:[birth year]19[birth year]20 About your hospitalization You were admitted on:  June 5, 2018 You last received care in the:  [facility] You were discharged on:  June 5, 2018 Why you were hospitalized Your primary diagnosis was:  Not on File Follow-up Information Follow up With Details Comments Contact Info Kyler Ron MD   [address] [facility] [address] 200 [facility] 
[phone] Flor Umana MD   [address] [id number] [phone] Discharge Orders None A check page indicates which time of day the medication should be taken. My Medications CONTINUE taking these medications Instructions Each Dose to Equal  
 Morning Noon Evening Bedtime ALPRAZolam 0.5 mg tablet Commonly known as:  Arash Lewis Your last dose was: Your next dose is:    
   
   
 TK 1 T PO ONCE A DAY FOR 30 DAYS  
     
   
   
   
  
 amLODIPine 2.5 mg tablet Commonly known as:  Emmanuelle Chester Your last dose was: Your next dose is:    
   
   
 TK 1 T PO  ONCE A DAY  
     
   
   
   
  
 aspirin delayed-release 81 mg tablet Your last dose was: Your next dose is: Take 81 mg by mouth daily. 81 mg  
    
   
   
   
  
 atorvastatin 40 mg tablet Commonly known as:  LIPITOR Your last dose was: Your next dose is:    
   
   
 TK 1 T PO  ONCE A DAY  
     
   
   
   
  
 CALCIUM 600 + D 600-125 mg-unit Tab Generic drug:  calcium-cholecalciferol (d3) Your last dose was: Your next dose is: Take 1,200 mg by mouth. 1200 mg  
    
   
   
   
  
 clopidogrel 75 mg Tab Commonly known as:  PLAVIX Your last dose was:     
   
Your next dose is:    
   
   
 TK 1 T PO QD  
     
   
   
   
  
 fish oil-omega-3 fatty acids 340-1,000 mg capsule Your last dose was: Your next dose is: Take 1 Cap by mouth two (2) times a week. Indications: takes 1200 2 times weekly 1 Cap  
    
   
   
   
  
 levothyroxine 25 mcg tablet Commonly known as:  SYNTHROID Your last dose was: Your next dose is:    
   
   
 TK 1 T PO QD IN THE MORNING OES  
     
   
   
   
  
 multivitamin tablet Commonly known as:  ONE A DAY Your last dose was: Your next dose is: Take 1 Tab by mouth daily. 1 Tab  
    
   
   
   
  
 oxyCODONE-acetaminophen 5-325 mg per tablet Commonly known as:  PERCOCET Your last dose was: Your next dose is: Take 1 Tab by mouth every six (6) hours as needed for Pain. Max Daily Amount: 4 Tabs. 1 Tab  
    
   
   
   
  
 tamoxifen 20 mg tablet Commonly known as:  NOLVADEX Your last dose was: Your next dose is: Take 1 Tab by mouth daily. 20 mg Opioid Education Prescription Opioids: What You Need to Know: 
 
 
Call your Physician if you: 
? Observe any excess bleeding. ? Develop a temperature over 100.5o F. 
? Experience abdominal, shoulder or chest pain. ? Notice any signs of decreased circulation or nerve impairment to an extremity such as a change in color, persistent numbness, tingling, coldness or increase in pain. ? Vomit blood or you have nausea and vomiting lasting longer than 4 hours. ? Are unable to take medications.  
? Are unable to urinate within 8 hours after discharge following general anesthesia or intravenous sedation. For the next 24 hours after receiving general anesthesia or intravenous sedation, or while taking prescription Narcotics, limit your activities: 
? Do NOT drive a motor vehicle, operate hazard machinery or power tools, or perform tasks that require coordination. The medication you received during your procedure may have some effect on your mental awareness. ? Do NOT make important personal or business decisions. The medication you received during your procedure may have some effect on your mental awareness. ? Do NOT drink alcoholic beverages. These drinks do not mix well with the medications that have been given to you. ? Upon discharge from the hospital, you must be accompanied by a responsible adult. ? Resume your diet as directed by your physician. ? Resume medications as your physician has prescribed. ? Please give a list of your current medications to your Primary Care Provider. ? Please update this list whenever your medications are discontinued, doses are changed, or new medications (including over-the-counter products) are added. ? Please carry medication information at all times in case of emergency situations. These are general instructions for a healthy lifestyle: No smoking/ No tobacco products/ Avoid exposure to second hand smoke. ? Surgeon General's Warning:  Quitting smoking now greatly reduces serious risk to your health. Obesity, smoking, and a sedentary lifestyle greatly increase your risk for illness. ? A healthy diet, regular physical exercise & weight monitoring are important for maintaining a healthy lifestyle ? You may be retaining fluid if you have a history of heart failure or if you experience any of the following symptoms:  Weight gain of 3 pounds or more overnight or 5 pounds in a week, increased swelling in our hands or feet or shortness of breath while lying flat in bed.   Please call your doctor as soon as you notice any of these symptoms; do not wait until your next office visit. Recognize signs and symptoms of STROKE: 
F  -  Face looks uneven A  -  Arms unable to move or move unevenly S  -  Speech slurred or non-existent T  -  Time to call 911 - as soon as signs and symptoms begin - DO NOT go back to bed or wait to see If you get better - TIME IS BRAIN. Colorectal Screening ? Colorectal cancer almost always develops from precancerous polyps (abnormal growths) in the colon or rectum. Screening tests can find precancerous polyps, so that they can be removed before they turn into cancer. Screening tests can also find colorectal cancer early, when treatment works best. 
? Speak with your physician about when you should begin screening and how often you should be tested. Colonoscopy: What to Expect at [facility] After you have a colonoscopy, you will stay at the clinic for 1 to 2 hours until the medicines wear off. Then you can go home. But you will need to arrange for a ride. Your doctor will tell you when you can eat and do your other usual activities. Your doctor will talk to you about when you will need your next colonoscopy. Your doctor can help you decide how often you need to be checked. This will depend on the results of your test and your risk for colorectal cancer. After the test, you may be bloated or have gas pains. You may need to pass gas. If a biopsy was done or a polyp was removed, you may have streaks of blood in your stool (feces) for a few days. This care sheet gives you a general idea about how long it will take for you to recover. But each person recovers at a different pace. Follow the steps below to get better as quickly as possible. How can you care for yourself at home? Activity · Rest when you feel tired. · You can do your normal activities when it feels okay to do so. Diet · Follow your doctor's directions for eating. · Unless your doctor has told you not to, drink plenty of fluids. This helps to replace the fluids that were lost during the colon prep. · Do not drink alcohol. Medicines · If polyps were removed or a biopsy was done during the test, your doctor may tell you not to take aspirin or other anti-inflammatory medicines for a few days. These include ibuprofen (Advil, Motrin) and naproxen (Aleve). Other instructions · For your safety, do not drive or operate machinery until the medicine wears off and you can think clearly. Your doctor may tell you not to drive or operate machinery until the day after your test. 
· Do not sign legal documents or make major decisions until the medicine wears off and you can think clearly. The anesthesia can make it hard for you to fully understand what you are agreeing to. Follow-up care is a key part of your treatment and safety. Be sure to make and go to all appointments, and call your doctor if you are having problems. It's also a good idea to know your test results and keep a list of the medicines you take. When should you call for help? Call 911 anytime you think you may need emergency care. For example, call if: 
· You passed out (lost consciousness). · You pass maroon or bloody stools. · You have severe belly pain. Call your doctor now or seek immediate medical care if: 
· Your stools are black and tarlike. · Your stools have streaks of blood, but you did not have a biopsy or any polyps removed. · You have belly pain, or your belly is swollen and firm. · You vomit. · You have a fever. · You are very dizzy. Watch closely for changes in your health, and be sure to contact your doctor if you have any problems. Where can you learn more? Go to Babycare.be Enter E264 in the search box to learn more about \"Colonoscopy: What to Expect at Home. \"  
© 2563-2409 Healthwise, Incorporated.  Care instructions adapted under license by [facility] (which disclaims liability or warranty for this information). This care instruction is for use with your licensed healthcare professional. If you have questions about a medical condition or this instruction, always ask your healthcare professional. [address] any warranty or liability for your use of this information. Content Version: 86.2.513496; Current as of: November 14, 2014 Diverticulosis: Care Instructions Your Care Instructions In diverticulosis, pouches called diverticula form in the wall of the large intestine (colon). The pouches do not cause any pain or other symptoms. Most people who have diverticulosis do not know they have it. But the pouches sometimes bleed, and if they become infected, they can cause pain and other symptoms. When this happens, it is called diverticulitis. Diverticula form when pressure pushes the wall of the colon outward at certain weak points. A diet that is too low in fiber can cause diverticula. Follow-up care is a key part of your treatment and safety. Be sure to make and go to all appointments, and call your doctor if you are having problems. It's also a good idea to know your test results and keep a list of the medicines you take. How can you care for yourself at home? · Include fruits, leafy green vegetables, beans, and whole grains in your diet each day. These foods are high in fiber. · Take a fiber supplement, such as Citrucel or Metamucil, every day if needed. Read and follow all instructions on the label. · Drink plenty of fluids, enough so that your urine is light yellow or clear like water. If you have kidney, heart, or liver disease and have to limit fluids, talk with your doctor before you increase the amount of fluids you drink. · Get at least 30 minutes of exercise on most days of the week. Walking is a good choice.  You also may want to do other activities, such as running, swimming, cycling, or playing tennis or team sports. · Cut out foods that cause gas, pain, or other symptoms. When should you call for help? Call your doctor now or seek immediate medical care if: 
? · You have belly pain. ? · You pass maroon or very bloody stools. ? · You have a fever. ? · You have nausea and vomiting. ? · You have unusual changes in your bowel movements or abdominal swelling. ? · You have burning pain when you urinate. ? · You have abnormal vaginal discharge. ? · You have shoulder pain. ? · You have cramping pain that does not get better when you have a bowel movement or pass gas. ? · You pass gas or stool from your urethra while urinating. ? Watch closely for changes in your health, and be sure to contact your doctor if you have any problems. Where can you learn more? Go to http://rubin-jaqueline.info/. Enter D156 in the search box to learn more about \"Diverticulosis: Care Instructions. \" Current as of: May 12, 2017 Content Version: 11.4 © 2478-4023 QBE. Care instructions adapted under license by [employer] (which disclaims liability or warranty for this information). If you have questions about a medical condition or this instruction, always ask your healthcare professional. [address] any warranty or liability for your use of this information. Introducing [facility]! [employer] introduces [employer] patient portal. Now you can access parts of your medical record, email your doctor's office, and request medication refills online. 1. In your internet browser, go to [email or web address] 2. Click on the First Time User? Click Here link in the Sign In box. You will see the New Member Sign Up page. 3. Enter your [employer] AS Access Code exactly as it appears below.  You will not need to use this code after youve completed the sign-up process. If you do not sign up before the expiration date, you must request a new code. · [facility] Access Code: [email or web address] Expires: 7/17/2018  2:58 PM 
 
4. Enter the last four digits of your Social Security Number (xxxx) and Date of Birth (mm/dd/yyyy) as indicated and click Submit. You will be taken to the next sign-up page. 5. Create a [facility] ID. This will be your [facility] login ID and cannot be changed, so think of one that is secure and easy to remember. 6. Create a [facility] password. You can change your password at any time. 7. Enter your Password Reset Question and Answer. This can be used at a later time if you forget your password. 8. Enter your e-mail address. You will receive e-mail notification when new information is available in [address] 9. Click Sign Up. You can now view and download portions of your medical record. 10. Click the Download Summary menu link to download a portable copy of your medical information. If you have questions, please visit the Frequently Asked Questions section of the [facility] website. Remember, [facility] is NOT to be used for urgent needs. For medical emergencies, dial 911. Now available from your iPhone and Android! Introducing Ayaz Venegas As a [facility] patient, I wanted to make you aware of our electronic visit tool called Ayaz Venegas. [facility] 24/7 allows you to connect within minutes with a medical provider 24 hours a day, seven days a week via a mobile device or tablet or logging into a secure website from your computer. You can access Ayaz Davidrustyfin from anywhere in the [place].  
 
A virtual visit might be right for you when you have a simple condition and feel like you just dont want to get out of bed, or cant get away from work for an appointment, when your regular [facility] provider is not available (evenings, weekends or holidays), or when youre out of town and need minor care. Electronic visits cost only $49 and if the [facility] 24/7 provider determines a prescription is needed to treat your condition, one can be electronically transmitted to a nearby pharmacy*. Please take a moment to enroll today if you have not already done so. The enrollment process is free and takes just a few minutes. To enroll, please download the [facility] 24/7 ganesh to your tablet or phone, or visit [email or web address] to enroll on your computer. And, as an [address] [place] [address] patient with a [employer] account, the results of your visits will be scanned into your electronic medical record and your primary care provider will be able to view the scanned results. We urge you to continue to see your regular [facility] provider for your ongoing medical care. And while your primary care provider may not be the one available when you seek a [facility] virtual visit, the peace of mind you get from getting a real diagnosis real time can be priceless. For more information on [facility], view our Frequently Asked Questions (FAQs) at [email or web address]. Sincerely, 
 
Jim Mclean MD 
Chief Medical Officer [address] [place] [address] *:  certain medications cannot be prescribed via [facility] Providers Seen During Your Hospitalization Provider Specialty Primary office phone Susie Carvajal MD Gastroenterology [phone] Your Primary Care Physician (PCP) Primary Care Physician Office Phone Office Fax Mariana Faulkner [phone] You are allergic to the following No active allergies Recent Documentation Height Weight Breastfeeding? BMI OB Status Smoking Status 1.511 m 54 kg No 23.63 kg/m2 Hysterectomy Former Smoker Emergency Contacts Name Discharge Info Relation Home Work Mobile [address] CAREGIVER [3] Spouse [3] [phone] Patient Belongings The following personal items are in your possession at time of discharge: 
  Dental Appliances: None  Visual Aid: None Please provide this summary of care documentation to your next provider. Signatures-by signing, you are acknowledging that this After Visit Summary has been reviewed with you and you have received a copy. Patient Signature:  ____________________________________________________________ Date:  ____________________________________________________________  
  
[facility] Provider Signature:  ____________________________________________________________ Date:  ____________________________________________________________

## 2018-10-24 ENCOUNTER — HOSPITAL ENCOUNTER (OUTPATIENT)
Dept: MAMMOGRAPHY | Age: 78
Discharge: HOME | End: 2018-10-24
Payer: MEDICARE

## 2018-10-24 DIAGNOSIS — Z85.3 PERSONAL HISTORY OF BREAST CANCER: ICD-10-CM

## 2018-10-24 PROCEDURE — 77065 DX MAMMO INCL CAD UNI: CPT

## 2018-10-31 ENCOUNTER — OFFICE VISIT (OUTPATIENT)
Dept: SURGERY | Age: 78
End: 2018-10-31

## 2018-10-31 VITALS — WEIGHT: 119 LBS | HEIGHT: 60 IN | RESPIRATION RATE: 16 BRPM | BODY MASS INDEX: 23.36 KG/M2

## 2018-10-31 DIAGNOSIS — Z85.3 PERSONAL HISTORY OF BREAST CANCER: Primary | ICD-10-CM

## 2018-10-31 NOTE — PROGRESS NOTES
Progress Note    Patient: Smith Ruiz  MRN: [id number]  SSN: [id number]   YOB: 1940  Age: 70 y.o. Sex: female     Chief Complaint   Patient presents with    Follow-up     6 month mammo birads 2       HPI    Ms. Brittnee powers is a 28-year-old woman who is status post a right mastectomy for a T2 N0 M0 left breast cancer. She was ER and KY positive and HER-2 negative. This was done in 2017. She reports no ill health or breast health complaints. Past Medical History:   Diagnosis Date    Breast CA ([facility] [address] 2017    left     High cholesterol     Hypertension     Sleep apnea     cpap    Thyroid disease     TIA (transient ischemic attack)     approx 8 years ago     Past Surgical History:   Procedure Laterality Date    HX HEENT Left     retina surgery    HX HYSTERECTOMY      KY MASTECTOMY, SIMPLE, COMPLETE Left 02/21/2017    Dr. Hanks Gun     No Known Allergies  Current Outpatient Medications   Medication Sig Dispense Refill    multivitamin (ONE A DAY) tablet Take 1 Tab by mouth daily.  fish oil-omega-3 fatty acids 340-1,000 mg capsule Take 1 Cap by mouth two (2) times a week. Indications: takes 1200 2 times weekly      calcium-cholecalciferol, d3, (CALCIUM 600 + D) 600-125 mg-unit tab Take 1,200 mg by mouth.  tamoxifen (NOLVADEX) 20 mg tablet Take 1 Tab by mouth daily. 90 Tab 3    aspirin delayed-release 81 mg tablet Take 81 mg by mouth daily.  ALPRAZolam (XANAX) 0.5 mg tablet TK 1 T PO ONCE A DAY FOR 30 DAYS  0    amLODIPine (NORVASC) 2.5 mg tablet TK 1 T PO  ONCE A DAY  3    atorvastatin (LIPITOR) 40 mg tablet TK 1 T PO  ONCE A DAY  2    clopidogrel (PLAVIX) 75 mg tab TK 1 T PO QD  2    levothyroxine (SYNTHROID) 25 mcg tablet TK 1 T PO QD IN THE MORNING OES  2    oxyCODONE-acetaminophen (PERCOCET) 5-325 mg per tablet Take 1 Tab by mouth every six (6) hours as needed for Pain. Max Daily Amount: 4 Tabs.  30 Tab 0     Social History     Socioeconomic History    Marital status:      Spouse name: Not on file    Number of children: Not on file    Years of education: Not on file    Highest education level: Not on file   Social Needs    Financial resource strain: Not on file    Food insecurity - worry: Not on file    Food insecurity - inability: Not on file    Transportation needs - medical: Not on file   "1,2,3 Listo" needs - non-medical: Not on file   Occupational History    Not on file   Tobacco Use    Smoking status: Former Smoker    Smokeless tobacco: Never Used   Substance and Sexual Activity    Alcohol use: Yes     Comment: occasionally    Drug use: No    Sexual activity: Not on file   Other Topics Concern    Not on file   Social History Narrative    Not on file     Family History   Problem Relation Age of Onset    Cancer Mother 36        Throat         Review of systems:  Patient denies any reflux, emesis, abdominal pain, change in bowel habits, hematochezia, melena, fever, weight loss, fatigue chills, dermatitis, abnormal moles, change in vision, vertigo, epistaxis, dysphagia, hoarseness, chest pain, palpitations, hypertension, edema, cough, shortness of breath, wheezing, hemoptysis, snoring, hematuria, diabetes, thyroid disease, anemia, bruising, history of blood transfusion, dizziness, headache, or fainting.     Physical Examination    Well developed well nourished female in no apparent distress  Visit Vitals  Resp 16   Ht 4' 11.5\" (1.511 m)   Wt 54 kg (119 lb)   BMI 23.63 kg/m²      Head: normocephalic, atraumatic  Mouth: Clear, no overt lesions, oral mucosa pink and moist  Neck: supple, no masses, no adenopathy or carotid bruits, trachea midline  Resp: clear to auscultation bilaterally, no wheeze, rhonchi or rales, excursions normal and symmetrical  Cardio: Regular rate and rhythm, no murmurs, clicks, gallops or rubs, no edema or varicosities  Abdomen: soft, nontender, nondistended, normoactive bowel sounds, no hernias, no hepatosplenomegaly, Back: Deferred  Extremeties: warm, well-perfused, no tenderness or swelling, normal gait/station  Neuro: sensation and strength grossly intact and symmetrical  Psych: alert and oriented to person, place and time  Breast exam left mastectomy site without evidence of recurrence or lymphadenopathy normal exam right breast    IMPRESSION  History of early stage left breast cancer, status post mastectomy without evidence of recurrence    PLAN  No orders of the defined types were placed in this encounter.     Follow-up in 6 months with mammogram  Josefina Andrew MD

## 2018-11-08 ENCOUNTER — APPOINTMENT (OUTPATIENT)
Dept: GENERAL RADIOLOGY | Age: 78
DRG: 305 | End: 2018-11-08
Attending: EMERGENCY MEDICINE
Payer: MEDICARE

## 2018-11-08 ENCOUNTER — HOSPITAL ENCOUNTER (INPATIENT)
Age: 78
LOS: 1 days | Discharge: HOME | DRG: 305 | End: 2018-11-09
Attending: EMERGENCY MEDICINE | Admitting: HOSPITALIST
Payer: MEDICARE

## 2018-11-08 ENCOUNTER — APPOINTMENT (OUTPATIENT)
Dept: NON INVASIVE DIAGNOSTICS | Age: 78
DRG: 305 | End: 2018-11-08
Attending: INTERNAL MEDICINE
Payer: MEDICARE

## 2018-11-08 DIAGNOSIS — I21.4 NSTEMI (NON-ST ELEVATED MYOCARDIAL INFARCTION) (HCC): Primary | ICD-10-CM

## 2018-11-08 PROBLEM — R77.8 ELEVATED TROPONIN: Status: ACTIVE | Noted: 2018-11-08

## 2018-11-08 LAB
ALBUMIN SERPL-MCNC: 4 G/DL (ref 3.4–5)
ALBUMIN/GLOB SERPL: 1.3 {RATIO} (ref 0.8–1.7)
ALP SERPL-CCNC: 54 U/L (ref 45–117)
ALT SERPL-CCNC: 25 U/L (ref 13–56)
ANION GAP SERPL CALC-SCNC: 11 MMOL/L (ref 3–18)
APPEARANCE UR: CLEAR
AST SERPL-CCNC: 30 U/L (ref 15–37)
ATRIAL RATE: 64 BPM
BASOPHILS # BLD: 0 K/UL (ref 0–0.1)
BASOPHILS NFR BLD: 0 % (ref 0–2)
BILIRUB SERPL-MCNC: 0.3 MG/DL (ref 0.2–1)
BILIRUB UR QL: NEGATIVE
BUN SERPL-MCNC: 18 MG/DL (ref 7–18)
BUN/CREAT SERPL: 23 (ref 12–20)
CALCIUM SERPL-MCNC: 9.1 MG/DL (ref 8.5–10.1)
CALCULATED P AXIS, ECG09: 13 DEGREES
CALCULATED R AXIS, ECG10: 22 DEGREES
CALCULATED T AXIS, ECG11: 10 DEGREES
CHLORIDE SERPL-SCNC: 101 MMOL/L (ref 100–108)
CK MB CFR SERPL CALC: 2.5 % (ref 0–4)
CK MB CFR SERPL CALC: ABNORMAL % (ref 0–4)
CK MB SERPL-MCNC: 1.3 NG/ML (ref 5–25)
CK MB SERPL-MCNC: <1 NG/ML (ref 5–25)
CK SERPL-CCNC: 51 U/L (ref 26–192)
CK SERPL-CCNC: 64 U/L (ref 26–192)
CO2 SERPL-SCNC: 26 MMOL/L (ref 21–32)
COLOR UR: YELLOW
CREAT SERPL-MCNC: 0.79 MG/DL (ref 0.6–1.3)
D DIMER PPP FEU-MCNC: <0.27 UG/ML(FEU)
DIAGNOSIS, 93000: NORMAL
DIFFERENTIAL METHOD BLD: ABNORMAL
ECHO AO ROOT DIAM: 2.98 CM
ECHO LA AREA 4C: 11.6 CM2
ECHO LA VOL 2C: 42.03 ML (ref 22–52)
ECHO LA VOL 4C: 19.87 ML (ref 22–52)
ECHO LA VOL BP: 32.3 ML (ref 22–52)
ECHO LA VOL/BSA BIPLANE: 21.84 ML/M2 (ref 16–28)
ECHO LA VOLUME INDEX A2C: 28.42 ML/M2 (ref 16–28)
ECHO LA VOLUME INDEX A4C: 13.44 ML/M2 (ref 16–28)
ECHO LV E' LATERAL VELOCITY: 4.86 CM/S
ECHO LV E' SEPTAL VELOCITY: 4.75 CM/S
ECHO LV INTERNAL DIMENSION DIASTOLIC: 3.22 CM (ref 3.9–5.3)
ECHO LV INTERNAL DIMENSION SYSTOLIC: 2.12 CM
ECHO LV IVSD: 1.32 CM (ref 0.6–0.9)
ECHO LV MASS 2D: 156.5 G (ref 67–162)
ECHO LV MASS INDEX 2D: 105.8 G/M2 (ref 43–95)
ECHO LV POSTERIOR WALL DIASTOLIC: 1.28 CM (ref 0.6–0.9)
ECHO LVOT DIAM: 1.84 CM
ECHO LVOT PEAK GRADIENT: 4 MMHG
ECHO LVOT PEAK VELOCITY: 99.86 CM/S
ECHO LVOT VTI: 19.65 CM
ECHO MV A VELOCITY: 121.55 CM/S
ECHO MV E DECELERATION TIME (DT): 337.6 MS
ECHO MV E VELOCITY: 0.83 CM/S
ECHO MV E/A RATIO: 0.01
ECHO MV E/E' LATERAL: 0.17
ECHO MV E/E' RATIO (AVERAGED): 0.17
ECHO MV E/E' SEPTAL: 0.17
ECHO PVEIN A DURATION: 135.7 MS
ECHO PVEIN A VELOCITY: 42.92 CM/S
EOSINOPHIL # BLD: 0.1 K/UL (ref 0–0.4)
EOSINOPHIL NFR BLD: 2 % (ref 0–5)
ERYTHROCYTE [DISTWIDTH] IN BLOOD BY AUTOMATED COUNT: 12.1 % (ref 11.6–14.5)
GLOBULIN SER CALC-MCNC: 3.2 G/DL (ref 2–4)
GLUCOSE BLD STRIP.AUTO-MCNC: 97 MG/DL (ref 70–110)
GLUCOSE SERPL-MCNC: 94 MG/DL (ref 74–99)
GLUCOSE UR STRIP.AUTO-MCNC: NEGATIVE MG/DL
HCT VFR BLD AUTO: 44.9 % (ref 35–45)
HGB BLD-MCNC: 15.2 G/DL (ref 12–16)
HGB UR QL STRIP: NEGATIVE
INR PPP: 1 (ref 0.8–1.2)
KETONES UR QL STRIP.AUTO: NEGATIVE MG/DL
LEUKOCYTE ESTERASE UR QL STRIP.AUTO: NEGATIVE
LYMPHOCYTES # BLD: 2 K/UL (ref 0.9–3.6)
LYMPHOCYTES NFR BLD: 32 % (ref 21–52)
MCH RBC QN AUTO: 32 PG (ref 24–34)
MCHC RBC AUTO-ENTMCNC: 33.9 G/DL (ref 31–37)
MCV RBC AUTO: 94.5 FL (ref 74–97)
MONOCYTES # BLD: 0.8 K/UL (ref 0.05–1.2)
MONOCYTES NFR BLD: 12 % (ref 3–10)
NEUTS SEG # BLD: 3.3 K/UL (ref 1.8–8)
NEUTS SEG NFR BLD: 54 % (ref 40–73)
NITRITE UR QL STRIP.AUTO: NEGATIVE
P-R INTERVAL, ECG05: 140 MS
PH UR STRIP: 6.5 [PH] (ref 5–8)
PLATELET # BLD AUTO: 269 K/UL (ref 135–420)
PMV BLD AUTO: 9.6 FL (ref 9.2–11.8)
POTASSIUM SERPL-SCNC: 3.9 MMOL/L (ref 3.5–5.5)
PROT SERPL-MCNC: 7.2 G/DL (ref 6.4–8.2)
PROT UR STRIP-MCNC: NEGATIVE MG/DL
PROTHROMBIN TIME: 12.5 SEC (ref 11.5–15.2)
Q-T INTERVAL, ECG07: 442 MS
QRS DURATION, ECG06: 86 MS
QTC CALCULATION (BEZET), ECG08: 455 MS
RBC # BLD AUTO: 4.75 M/UL (ref 4.2–5.3)
SODIUM SERPL-SCNC: 138 MMOL/L (ref 136–145)
SP GR UR REFRACTOMETRY: 1 (ref 1–1.03)
TROPONIN I SERPL-MCNC: 0.21 NG/ML (ref 0–0.04)
TROPONIN I SERPL-MCNC: 0.23 NG/ML (ref 0–0.04)
TROPONIN I SERPL-MCNC: 0.27 NG/ML (ref 0–0.06)
UROBILINOGEN UR QL STRIP.AUTO: 0.2 EU/DL (ref 0.2–1)
VENTRICULAR RATE, ECG03: 64 BPM
WBC # BLD AUTO: 6.3 K/UL (ref 4.6–13.2)

## 2018-11-08 PROCEDURE — 82962 GLUCOSE BLOOD TEST: CPT

## 2018-11-08 PROCEDURE — 93306 TTE W/DOPPLER COMPLETE: CPT

## 2018-11-08 PROCEDURE — 74011250637 HC RX REV CODE- 250/637: Performed by: INTERNAL MEDICINE

## 2018-11-08 PROCEDURE — 93005 ELECTROCARDIOGRAM TRACING: CPT

## 2018-11-08 PROCEDURE — 74011250636 HC RX REV CODE- 250/636: Performed by: EMERGENCY MEDICINE

## 2018-11-08 PROCEDURE — 71045 X-RAY EXAM CHEST 1 VIEW: CPT

## 2018-11-08 PROCEDURE — 80053 COMPREHEN METABOLIC PANEL: CPT

## 2018-11-08 PROCEDURE — 74011250637 HC RX REV CODE- 250/637: Performed by: EMERGENCY MEDICINE

## 2018-11-08 PROCEDURE — 65660000000 HC RM CCU STEPDOWN

## 2018-11-08 PROCEDURE — 74011250636 HC RX REV CODE- 250/636: Performed by: HOSPITALIST

## 2018-11-08 PROCEDURE — 85379 FIBRIN DEGRADATION QUANT: CPT

## 2018-11-08 PROCEDURE — 85610 PROTHROMBIN TIME: CPT

## 2018-11-08 PROCEDURE — 99285 EMERGENCY DEPT VISIT HI MDM: CPT

## 2018-11-08 PROCEDURE — 74011250636 HC RX REV CODE- 250/636: Performed by: INTERNAL MEDICINE

## 2018-11-08 PROCEDURE — 36415 COLL VENOUS BLD VENIPUNCTURE: CPT

## 2018-11-08 PROCEDURE — 82553 CREATINE MB FRACTION: CPT

## 2018-11-08 PROCEDURE — 81003 URINALYSIS AUTO W/O SCOPE: CPT

## 2018-11-08 PROCEDURE — 85025 COMPLETE CBC W/AUTO DIFF WBC: CPT

## 2018-11-08 RX ORDER — ENOXAPARIN SODIUM 100 MG/ML
1 INJECTION SUBCUTANEOUS EVERY 12 HOURS
Status: DISCONTINUED | OUTPATIENT
Start: 2018-11-08 | End: 2018-11-08

## 2018-11-08 RX ORDER — ALPRAZOLAM 0.5 MG/1
0.5 TABLET ORAL
Status: DISCONTINUED | OUTPATIENT
Start: 2018-11-08 | End: 2018-11-09 | Stop reason: HOSPADM

## 2018-11-08 RX ORDER — THERA TABS 400 MCG
1 TAB ORAL DAILY
Status: DISCONTINUED | OUTPATIENT
Start: 2018-11-08 | End: 2018-11-09 | Stop reason: HOSPADM

## 2018-11-08 RX ORDER — ENOXAPARIN SODIUM 100 MG/ML
1 INJECTION SUBCUTANEOUS EVERY 12 HOURS
Status: COMPLETED | OUTPATIENT
Start: 2018-11-08 | End: 2018-11-08

## 2018-11-08 RX ORDER — TAMOXIFEN CITRATE 10 MG/1
20 TABLET, FILM COATED ORAL DAILY
Status: DISCONTINUED | OUTPATIENT
Start: 2018-11-08 | End: 2018-11-09 | Stop reason: HOSPADM

## 2018-11-08 RX ORDER — LEVOTHYROXINE SODIUM 25 UG/1
25 TABLET ORAL
Status: DISCONTINUED | OUTPATIENT
Start: 2018-11-08 | End: 2018-11-09 | Stop reason: HOSPADM

## 2018-11-08 RX ORDER — GUAIFENESIN 100 MG/5ML
324 LIQUID (ML) ORAL
Status: COMPLETED | OUTPATIENT
Start: 2018-11-08 | End: 2018-11-08

## 2018-11-08 RX ORDER — OXYCODONE AND ACETAMINOPHEN 5; 325 MG/1; MG/1
1 TABLET ORAL
Status: DISCONTINUED | OUTPATIENT
Start: 2018-11-08 | End: 2018-11-09 | Stop reason: HOSPADM

## 2018-11-08 RX ORDER — ENOXAPARIN SODIUM 100 MG/ML
1 INJECTION SUBCUTANEOUS
Status: DISCONTINUED | OUTPATIENT
Start: 2018-11-08 | End: 2018-11-08 | Stop reason: CLARIF

## 2018-11-08 RX ORDER — ASPIRIN 81 MG/1
81 TABLET ORAL DAILY
Status: DISCONTINUED | OUTPATIENT
Start: 2018-11-08 | End: 2018-11-09 | Stop reason: HOSPADM

## 2018-11-08 RX ORDER — ENOXAPARIN SODIUM 100 MG/ML
1 INJECTION SUBCUTANEOUS
Status: COMPLETED | OUTPATIENT
Start: 2018-11-08 | End: 2018-11-08

## 2018-11-08 RX ORDER — ACETAMINOPHEN 325 MG/1
650 TABLET ORAL
Status: DISCONTINUED | OUTPATIENT
Start: 2018-11-08 | End: 2018-11-09 | Stop reason: HOSPADM

## 2018-11-08 RX ORDER — AMLODIPINE BESYLATE 5 MG/1
5 TABLET ORAL DAILY
Status: DISCONTINUED | OUTPATIENT
Start: 2018-11-08 | End: 2018-11-09 | Stop reason: HOSPADM

## 2018-11-08 RX ORDER — ENOXAPARIN SODIUM 100 MG/ML
1 INJECTION SUBCUTANEOUS
Status: CANCELLED | OUTPATIENT
Start: 2018-11-08 | End: 2018-11-08

## 2018-11-08 RX ORDER — ATORVASTATIN CALCIUM 40 MG/1
40 TABLET, FILM COATED ORAL
Status: DISCONTINUED | OUTPATIENT
Start: 2018-11-08 | End: 2018-11-09 | Stop reason: HOSPADM

## 2018-11-08 RX ORDER — CLOPIDOGREL BISULFATE 75 MG/1
75 TABLET ORAL DAILY
Status: DISCONTINUED | OUTPATIENT
Start: 2018-11-08 | End: 2018-11-09 | Stop reason: HOSPADM

## 2018-11-08 RX ORDER — GLUCOSAM/CHONDRO/HERB 149/HYAL 750-100 MG
1 TABLET ORAL 2 TIMES WEEKLY
Status: DISCONTINUED | OUTPATIENT
Start: 2018-11-09 | End: 2018-11-09 | Stop reason: HOSPADM

## 2018-11-08 RX ADMIN — TAMOXIFEN CITRATE 20 MG: 10 TABLET, FILM COATED ORAL at 10:28

## 2018-11-08 RX ADMIN — ATORVASTATIN CALCIUM 40 MG: 40 TABLET, FILM COATED ORAL at 22:05

## 2018-11-08 RX ADMIN — ASPIRIN 81 MG: 81 TABLET, COATED ORAL at 10:28

## 2018-11-08 RX ADMIN — THERA TABS 1 TABLET: TAB at 10:28

## 2018-11-08 RX ADMIN — CLOPIDOGREL BISULFATE 75 MG: 75 TABLET ORAL at 10:28

## 2018-11-08 RX ADMIN — AMLODIPINE BESYLATE 5 MG: 5 TABLET ORAL at 06:38

## 2018-11-08 RX ADMIN — ENOXAPARIN SODIUM 50 MG: 60 INJECTION SUBCUTANEOUS at 17:08

## 2018-11-08 RX ADMIN — ENOXAPARIN SODIUM 50 MG: 100 INJECTION SUBCUTANEOUS at 04:21

## 2018-11-08 RX ADMIN — ASPIRIN 81 MG CHEWABLE TABLET 324 MG: 81 TABLET CHEWABLE at 02:56

## 2018-11-08 RX ADMIN — NITROGLYCERIN 0.5 INCH: 20 OINTMENT TOPICAL at 02:55

## 2018-11-08 RX ADMIN — LEVOTHYROXINE SODIUM 25 MCG: 25 TABLET ORAL at 06:38

## 2018-11-08 NOTE — ED NOTES
TRANSFER - OUT REPORT: 
 
Verbal report given to Devora Bravo RN(name) on Rosita Gaffney  being transferred to [address] [place] [address](unit) for routine progression of care Report consisted of patients Situation, Background, Assessment and  
Recommendations(SBAR). Information from the following report(s) SBAR was reviewed with the receiving nurse. Lines:  
Peripheral IV 11/08/18 Right Forearm (Active) Site Assessment Clean, dry, & intact 11/8/2018  1:58 AM  
Phlebitis Assessment 0 11/8/2018  1:58 AM  
Infiltration Assessment 0 11/8/2018  1:58 AM  
Dressing Status Clean, dry, & intact 11/8/2018  1:58 AM  
  
 
Opportunity for questions and clarification was provided. Patient transported with: 
 Monitor

## 2018-11-08 NOTE — PROGRESS NOTES
visited with the family of Timothy Son, who is a 70 y.o.,female. The  provided the following Interventions: 
Initiated a relationship of care and support. Patient not available at this time. Plan: 
Chaplains will continue to follow and will provide pastoral care on an as needed/requested basis.  recommends bedside caregivers page  on duty if patient shows signs of acute spiritual or emotional distress. Bernie Tyler Board Certified [address] [place] [address] Care  
[phone]

## 2018-11-08 NOTE — H&P
History & Physical 
PatientSandoval Celestin MRN: [id number]  CSN: [id number] YOB: 1940  Age: 70 y.o. Sex: female DOA: 11/8/2018 Chief Complaint Patient presents with  Hypertension HPI:  
 
Syl Valdez is a 70 y.o. female who has a h/o HTN, hypothyroidism, breast cancer, TIA and LATASHA on CPAP at home  presented to [facility] ER last evening for a HA and feeling \"fuzzy\" in the head. She denied any CP or SOB. She tells me that she has been feeling weak in her legs lately. She lives in a 2 story house. She is preferring lately not to go upstairs as her legs were getting tired easily. She does take care of herself and walks on the treadmill everyday. In the Er she was found to have elevated troponin 0.27 with normal EKG. Past Medical History:  
Diagnosis Date  Breast CA ([address] 2017  
 left  High cholesterol  Hypertension  Sleep apnea   
 cpap  Thyroid disease  TIA (transient ischemic attack)   
 approx 8 years ago Past Surgical History:  
Procedure Laterality Date  HX HEENT Left   
 retina surgery  HX HYSTERECTOMY  ND MASTECTOMY, SIMPLE, COMPLETE Left 02/21/2017 Dr. Yeung Homes Family History Problem Relation Age of Onset  Cancer Mother 39 Throat Social History Socioeconomic History  Marital status:  Spouse name: Not on file  Number of children: Not on file  Years of education: Not on file  Highest education level: Not on file Social Needs  Financial resource strain: Not on file  Food insecurity - worry: Not on file  Food insecurity - inability: Not on file  Transportation needs - medical: Not on file  Transportation needs - non-medical: Not on file Occupational History  Not on file Tobacco Use  Smoking status: Former Smoker  Smokeless tobacco: Never Used Substance and Sexual Activity  Alcohol use: Yes Comment: occasionally  Drug use: No  
 Sexual activity: Not on file Other Topics Concern  Not on file Social History Narrative  Not on file Prior to Admission medications Medication Sig Start Date End Date Taking? Authorizing Provider  
multivitamin (ONE A DAY) tablet Take 1 Tab by mouth daily. Provider, Historical  
fish oil-omega-3 fatty acids 340-1,000 mg capsule Take 1 Cap by mouth two (2) times a week. Indications: takes 1200 2 times weekly    Provider, Historical  
calcium-cholecalciferol, d3, (CALCIUM 600 + D) 600-125 mg-unit tab Take 1,200 mg by mouth. Provider, Historical  
tamoxifen (NOLVADEX) 20 mg tablet Take 1 Tab by mouth daily. 4/18/18   Richie Stapleton MD  
oxyCODONE-acetaminophen (PERCOCET) 5-325 mg per tablet Take 1 Tab by mouth every six (6) hours as needed for Pain. Max Daily Amount: 4 Tabs. 2/22/17   Richie Stapleton MD  
aspirin delayed-release 81 mg tablet Take 81 mg by mouth daily. Provider, Historical  
ALPRAZolam (XANAX) 0.5 mg tablet TK 1 T PO ONCE A DAY FOR 30 DAYS 1/20/17   Provider, Historical  
atorvastatin (LIPITOR) 40 mg tablet TK 1 T PO  ONCE A DAY 1/8/17   Provider, Historical  
clopidogrel (PLAVIX) 75 mg tab TK 1 T PO QD 1/30/17   Provider, Historical  
levothyroxine (SYNTHROID) 25 mcg tablet TK 1 T PO QD IN THE MORNING OES 11/23/16   Provider, Historical  
 
 
No Known Allergies Review of Systems GENERAL: No fevers or chills. HEENT: No change in vision, no earache, tinnitus, sore throat or sinus congestion. NECK: No pain or stiffness. CARDIOVASCULAR: No chest pain or pressure. No palpitations. PULMONARY: No shortness of breath, cough or wheeze. GASTROINTESTINAL: No abdominal pain, nausea, vomiting or diarrhea, melena or       bright red blood per rectum. GENITOURINARY: No urinary frequency, urgency, hesitancy or dysuria. MUSCULOSKELETAL: No joint or muscle pain, no back pain, no recent trauma. + leg weakness. DERMATOLOGIC: No rash, no itching, no lesions. ENDOCRINE: No polyuria, polydipsia, no heat or cold intolerance. No recent change in    weight. HEMATOLOGICAL: No anemia or easy bruising or bleeding. NEUROLOGIC: + headache, no seizures, numbness, tingling or weakness. PSYCHIATRIC: No depression, anxiety, mood disorder, no loss of interest in normal       activity or change in sleep pattern. Physical Exam:  
 
Physical Exam: 
Visit Vitals /85 Pulse 61 Temp 97.6 °F (36.4 °C) Resp 16 Ht 5' (1.524 m) Wt 54 kg (119 lb) SpO2 98% BMI 23.24 kg/m² O2 Device: Room air Temp (24hrs), Av.6 °F (36.4 °C), Min:97.5 °F (36.4 °C), Max:97.6 °F (36.4 °C) 
  
 
 1901 -  0700 In: -  
Out: 500 [Urine:500] No intake/output data recorded. General:  Alert, cooperative, no distress, appears stated age. Head:  Normocephalic, without obvious abnormality, atraumatic. Eyes:  Conjunctivae/corneas clear. PERRL, EOMs intact. Nose: Nares normal. No drainage or sinus tenderness. Throat: Lips, mucosa, and tongue normal. Teeth and gums normal.  
Neck: Supple, symmetrical, trachea midline, no adenopathy, thyroid: no enlargement/tenderness/nodules, no carotid bruit and no JVD. Back:   ROM normal. No CVA tenderness. Lungs:   Clear to auscultation bilaterally. Chest wall:  No tenderness or deformity. Heart:  Regular rate and rhythm, S1, S2 normal, no murmur, click, rub or gallop. Abdomen: Soft, non-tender. Bowel sounds normal. No masses,  No organomegaly. Extremities: Extremities normal, atraumatic, no cyanosis or edema. Pulses: 2+ and symmetric all extremities. Skin: Skin color, texture, turgor normal. No rashes or lesions Neurologic: CNII-XII intact. No focal motor or sensory deficit. Labs Reviewed: 
 
Recent Results (from the past 24 hour(s)) CBC WITH AUTOMATED DIFF Collection Time: 18  1:21 AM  
Result Value Ref Range WBC 6.3 4.6 - 13.2 K/uL  
 RBC 4.75 4.20 - 5.30 M/uL  
 HGB 15.2 12.0 - 16.0 g/dL HCT 44.9 35.0 - 45.0 % MCV 94.5 74.0 - 97.0 FL  
 MCH 32.0 24.0 - 34.0 PG  
 MCHC 33.9 31.0 - 37.0 g/dL  
 RDW 12.1 11.6 - 14.5 % PLATELET 848 745 - 083 K/uL MPV 9.6 9.2 - 11.8 FL  
 NEUTROPHILS 54 40 - 73 % LYMPHOCYTES 32 21 - 52 % MONOCYTES 12 (H) 3 - 10 % EOSINOPHILS 2 0 - 5 % BASOPHILS 0 0 - 2 %  
 ABS. NEUTROPHILS 3.3 1.8 - 8.0 K/UL  
 ABS. LYMPHOCYTES 2.0 0.9 - 3.6 K/UL  
 ABS. MONOCYTES 0.8 0.05 - 1.2 K/UL  
 ABS. EOSINOPHILS 0.1 0.0 - 0.4 K/UL  
 ABS. BASOPHILS 0.0 0.0 - 0.1 K/UL  
 DF AUTOMATED METABOLIC PANEL, COMPREHENSIVE Collection Time: 11/08/18  1:21 AM  
Result Value Ref Range Sodium 138 136 - 145 mmol/L Potassium 3.9 3.5 - 5.5 mmol/L Chloride 101 100 - 108 mmol/L  
 CO2 26 21 - 32 mmol/L Anion gap 11 3.0 - 18 mmol/L Glucose 94 74 - 99 mg/dL BUN 18 7.0 - 18 MG/DL Creatinine 0.79 0.6 - 1.3 MG/DL  
 BUN/Creatinine ratio 23 (H) 12 - 20 GFR est AA >60 >60 ml/min/1.73m2 GFR est non-AA >60 >60 ml/min/1.73m2 Calcium 9.1 8.5 - 10.1 MG/DL Bilirubin, total 0.3 0.2 - 1.0 MG/DL  
 ALT (SGPT) 25 13 - 56 U/L  
 AST (SGOT) 30 15 - 37 U/L Alk. phosphatase 54 45 - 117 U/L Protein, total 7.2 6.4 - 8.2 g/dL Albumin 4.0 3.4 - 5.0 g/dL Globulin 3.2 2.0 - 4.0 g/dL A-G Ratio 1.3 0.8 - 1.7 CARDIAC PANEL,(CK, CKMB & TROPONIN) Collection Time: 11/08/18  1:21 AM  
Result Value Ref Range CK 64 26 - 192 U/L  
 CK - MB <1.0 <3.6 ng/ml CK-MB Index  0.0 - 4.0 % CALCULATION NOT PERFORMED WHEN RESULT IS BELOW LINEAR LIMIT Troponin-I, Qt. 0.27 (H) 0.00 - 0.06 NG/ML  
GLUCOSE, POC Collection Time: 11/08/18  1:54 AM  
Result Value Ref Range Glucose (POC) 97 70 - 110 mg/dL EKG, 12 LEAD, INITIAL Collection Time: 11/08/18  1:54 AM  
Result Value Ref Range Ventricular Rate 64 BPM  
 Atrial Rate 64 BPM  
 P-R Interval 140 ms QRS Duration 86 ms  
 Q-T Interval 442 ms QTC Calculation (Bezet) 455 ms Calculated P Axis 13 degrees Calculated R Axis 22 degrees Calculated T Axis 10 degrees Diagnosis Normal sinus rhythm Normal ECG When compared with ECG of 08-FEB-2017 09:46, 
RSR' pattern in V1 is no longer present URINALYSIS W/ RFLX MICROSCOPIC Collection Time: 11/08/18  2:39 AM  
Result Value Ref Range Color YELLOW Appearance CLEAR Specific gravity 1.005 1.005 - 1.030    
 pH (UA) 6.5 5.0 - 8.0 Protein NEGATIVE  NEG mg/dL Glucose NEGATIVE  NEG mg/dL Ketone NEGATIVE  NEG mg/dL Bilirubin NEGATIVE  NEG Blood NEGATIVE  NEG Urobilinogen 0.2 0.2 - 1.0 EU/dL Nitrites NEGATIVE  NEG Leukocyte Esterase NEGATIVE  NEG Procedures/imaging: see electronic medical records for all procedures/Xrays and details which were not copied into this note but were reviewed prior to creation of Plan Assessment/Plan Active Problems: 1. Elevated troponin (11/8/2018) - possible NSTEMI, not proven yet. - will do the serial Troponin levels. - 2 D echo ordered. - cardiology consult, Dr. [facility] is aware. - Lovenox x BID as per Dr. [facility]. 
- aspirin and Plavix and Atorvastatin. 2. HTN 
- increase the dose of the Amlodipine to 5 mg. 3. Headache - Due to high blood pressure. 4. LATASHA 
- continue the CPAP. 5. Code status 
- full code 6. DVT prophylaxis - On Lovenox. Charito Leary MD 
November 8, 2018

## 2018-11-08 NOTE — ROUTINE PROCESS
Bedside, Verbal and Written shift change report given to [facility] (oncoming nurse) by Adonay Brandon RN (offgoing nurse). Report included the following information SBAR, Kardex, MAR and Accordion.

## 2018-11-08 NOTE — PROGRESS NOTES
Reason for Admission:   NSTEMI, elevated troponin RRAT Score:      12 Plan for utilizing home health: To be determined Likelihood of Readmission:  Green/low Transition of Care Plan:  Pt is AOx4 and reports she was independent prior to admission. She has no DMEs. She confirmed her pcp and demographic info. Her concern at this time is for her test results to be ok. She states she will drive herself to her appointments but her  or daughter will be picking her up when discharged.

## 2018-11-08 NOTE — PROGRESS NOTES
Pt admitted this am. Seen for follow up Pt feels great now. No dizziness or CP or SOB Labs, chart and vitals noted Trop trending down, D Dimer neg BP better D/w cardio, plan for stress test tomorrow Lovenox per cardio team   
D/w pt and family in detail Addendum: 
Cardio wants full dose Lovenox tonight and stop, just in case if pt needs cath in am

## 2018-11-08 NOTE — ED NOTES
Bedside and Verbal shift change report given to Luan Stuart by Jesus Manuel Fink RN (offgoing nurse). Report included the following information SBAR, Kardex, ED Summary, Procedure Summary, Intake/Output, MAR, Accordion and Recent Results.

## 2018-11-08 NOTE — ROUTINE PROCESS
TRANSFER - IN REPORT: 
 
Verbal report received from Leah Smith(name) on Timothy Son  being received from [facility](unit) for routine progression of care Report consisted of patients Situation, Background, Assessment and  
Recommendations(SBAR). Information from the following report(s) SBAR, Kardex, MAR and Accordion was reviewed with the receiving nurse. Opportunity for questions and clarification was provided. Patient arrived via ambulance, 
 
Assessment completed upon patients arrival to unit and care assumed. No distress noted, MD paged for admission.

## 2018-11-08 NOTE — CONSULTS
[facility] - Consult Note    Date of  Admission: 11/8/2018  1:40 AM     Primary Care Physician:  Tommy Navarro MD     Plan:     1) elevated troponin- ?possible ACS- no active chest pain. ekg no ischemia. Continue current meds. Will obtain echo and further cardiac w/u based on clinical course. 2) htn  3) h/o TIA on aspirin and plavix  4) dyslipidemia  5) hypothyroidism. Discussed with patient about plan. All questions answered. Will obtain D-dimer to r/o PE. Assessment:     Hospital Problems  Date Reviewed: 10/31/2018          Codes Class Noted POA    NSTEMI (non-ST elevated myocardial infarction) ([facility] ICD-10-CM: I21.4  ICD-9-CM: 410.70  11/8/2018 Unknown        Elevated troponin ICD-10-CM: R74.8  ICD-9-CM: 790.6  11/8/2018 Unknown                   History of Present Illness: This is a 70 y.o. female admitted for NSTEMI (non-ST elevated myocardial infarction) ([facility] Elevated troponin. Patient complains of:      Smith Ruiz is a 70 y.o. female who has a h/o HTN, hypothyroidism, breast cancer, TIA and LATASHA on CPAP at home  presented to [facility] ER last evening for a HA and feeling \"fuzzy\" in the head. She denied any CP or SOB. she has been feeling weak in her legs lately. She lives in a 2 story house. She is preferring lately not to go upstairs as her legs were getting tired easily. She does take care of herself and walks on the treadmill everyday. No chest pain or pressure. No palpitations. No SOB. No orthopnea. No cough. No pnd. No fever or chills. No diaphoresis. No leg swelling. No recent syncopal episodes. No blood in stool or urine. No nausea or vomit. No recent CVA.         Cardiac risk factors: dyslipidemia, TIA         Past Medical History:     Past Medical History:   Diagnosis Date    Breast CA ([facility] 2017    left     High cholesterol     Hypertension     Sleep apnea     cpap    Thyroid disease     TIA (transient ischemic attack)     approx 8 years ago Social History:     Social History     Socioeconomic History    Marital status:      Spouse name: Not on file    Number of children: Not on file    Years of education: Not on file    Highest education level: Not on file   Social Needs    Financial resource strain: Not on file    Food insecurity - worry: Not on file    Food insecurity - inability: Not on file   [employer] needs - medical: Not on file   [employer] needs - non-medical: Not on file   Occupational History    Not on file   Tobacco Use    Smoking status: Former Smoker    Smokeless tobacco: Never Used   Substance and Sexual Activity    Alcohol use: Yes     Comment: occasionally    Drug use: No    Sexual activity: Not on file   Other Topics Concern    Not on file   Social History Narrative    Not on file        Family History:     Family History   Problem Relation Age of Onset    Cancer Mother 39        Throat        Medications:   No Known Allergies     Current Facility-Administered Medications   Medication Dose Route Frequency    amLODIPine (NORVASC) tablet 5 mg  5 mg Oral DAILY    ALPRAZolam (XANAX) tablet 0.5 mg  0.5 mg Oral QHS PRN    atorvastatin (LIPITOR) tablet 40 mg  40 mg Oral QHS    clopidogrel (PLAVIX) tablet 75 mg  75 mg Oral DAILY    levothyroxine (SYNTHROID) tablet 25 mcg  25 mcg Oral 6am    aspirin delayed-release tablet 81 mg  81 mg Oral DAILY    oxyCODONE-acetaminophen (PERCOCET) 5-325 mg per tablet 1 Tab  1 Tab Oral Q6H PRN    tamoxifen (NOLVADEX) tablet 20 mg  20 mg Oral DAILY    therapeutic multivitamin (THERAGRAN) tablet 1 Tab  1 Tab Oral DAILY    [START ON 11/9/2018] omega 3-DHA-EPA-fish oil 1,000 mg (120 mg-180 mg) capsule 1 Cap  1 Cap Oral 2 TIMES WEEKLY    acetaminophen (TYLENOL) tablet 650 mg  650 mg Oral Q4H PRN    enoxaparin (LOVENOX) injection 50 mg  1 mg/kg SubCUTAneous Q12H        Review Of Systems:       A comprehensive review of systems was negative except for that written in the HPI. Constitutional: No fever, no chills, no weight loss, no night sweats   HEENT: No epistaxis, no nasal drainage, no difficulty in swallowing, no redness in eyes  Respiratory: negative for cough, sputum, hemoptysis, pleurisy/chest pain, wheezing, dyspnea on exertion or emphysema  Cardiovascular: no chest pain, no chest pressure, no dyspnea, no pnd, no claudication no palpitations, no chronic leg edema, no syncope  Gastrointestinal: no abd pain, no vomiting, no diarrhea, no bleeding symptoms  Genitourinary: No urinary symptoms or hematuria  Integument/breast: No ulcers or rashes  Musculoskeletal: no muscle pain, no weakness  Neurological: No focal weakness, no seizures, no headaches  Behvioral/Psych: No anxiety, no depression             Physical Exam:     Visit Vitals  /65   Pulse 61   Temp 97.7 °F (36.5 °C)   Resp 18   Ht 4' 11\" (1.499 m)   Wt 54 kg (119 lb)   SpO2 98%   BMI 24.04 kg/m²     BP Readings from Last 3 Encounters:   11/08/18 135/65   06/05/18 136/59   10/25/17 122/82     Pulse Readings from Last 3 Encounters:   11/08/18 61   06/05/18 (!) 55   02/22/17 86     Wt Readings from Last 3 Encounters:   11/08/18 54 kg (119 lb)   10/31/18 54 kg (119 lb)   06/05/18 54 kg (119 lb)       General:  alert, cooperative, no distress, appears stated age  Skin: Warm and dry, acyanotic, normal color. Head: Normocephalic, atraumatic. Eyes: Sclerae anicteric, conjunctivae without injection. Neck:  nontender, no nuchal rigidity, no masses, no stridor, no carotid bruit, no JVD  Lungs:  clear to auscultation bilaterally, no rhonchi  Heart:  regular rate and rhythm, S1, S2 normal, no murmur, click, rub or gallop  Abdomen:  abdomen is soft without significant tenderness, masses, organomegaly or guarding  Extremities:  extremities normal, atraumatic, no cyanosis or edema. Peripheral pulses   Neurological: grossly intact. No focal abnormalities, moves all extremities well.   Psychiatric Affect: The patient is awake, alert and oriented x3. Sandra Nuñez is interactive and appropriate. Data Review:     Recent Results (from the past 48 hour(s))   CBC WITH AUTOMATED DIFF    Collection Time: 11/08/18  1:21 AM   Result Value Ref Range    WBC 6.3 4.6 - 13.2 K/uL    RBC 4.75 4.20 - 5.30 M/uL    HGB 15.2 12.0 - 16.0 g/dL    HCT 44.9 35.0 - 45.0 %    MCV 94.5 74.0 - 97.0 FL    MCH 32.0 24.0 - 34.0 PG    MCHC 33.9 31.0 - 37.0 g/dL    RDW 12.1 11.6 - 14.5 %    PLATELET 445 973 - 891 K/uL    MPV 9.6 9.2 - 11.8 FL    NEUTROPHILS 54 40 - 73 %    LYMPHOCYTES 32 21 - 52 %    MONOCYTES 12 (H) 3 - 10 %    EOSINOPHILS 2 0 - 5 %    BASOPHILS 0 0 - 2 %    ABS. NEUTROPHILS 3.3 1.8 - 8.0 K/UL    ABS. LYMPHOCYTES 2.0 0.9 - 3.6 K/UL    ABS. MONOCYTES 0.8 0.05 - 1.2 K/UL    ABS. EOSINOPHILS 0.1 0.0 - 0.4 K/UL    ABS. BASOPHILS 0.0 0.0 - 0.1 K/UL    DF AUTOMATED     METABOLIC PANEL, COMPREHENSIVE    Collection Time: 11/08/18  1:21 AM   Result Value Ref Range    Sodium 138 136 - 145 mmol/L    Potassium 3.9 3.5 - 5.5 mmol/L    Chloride 101 100 - 108 mmol/L    CO2 26 21 - 32 mmol/L    Anion gap 11 3.0 - 18 mmol/L    Glucose 94 74 - 99 mg/dL    BUN 18 7.0 - 18 MG/DL    Creatinine 0.79 0.6 - 1.3 MG/DL    BUN/Creatinine ratio 23 (H) 12 - 20      GFR est AA >60 >60 ml/min/1.73m2    GFR est non-AA >60 >60 ml/min/1.73m2    Calcium 9.1 8.5 - 10.1 MG/DL    Bilirubin, total 0.3 0.2 - 1.0 MG/DL    ALT (SGPT) 25 13 - 56 U/L    AST (SGOT) 30 15 - 37 U/L    Alk.  phosphatase 54 45 - 117 U/L    Protein, total 7.2 6.4 - 8.2 g/dL    Albumin 4.0 3.4 - 5.0 g/dL    Globulin 3.2 2.0 - 4.0 g/dL    A-G Ratio 1.3 0.8 - 1.7     CARDIAC PANEL,(CK, CKMB & TROPONIN)    Collection Time: 11/08/18  1:21 AM   Result Value Ref Range    CK 64 26 - 192 U/L    CK - MB <1.0 <3.6 ng/ml    CK-MB Index  0.0 - 4.0 %     CALCULATION NOT PERFORMED WHEN RESULT IS BELOW LINEAR LIMIT    Troponin-I, Qt. 0.27 (H) 0.00 - 0.06 NG/ML   GLUCOSE, POC    Collection Time: 11/08/18  1:54 AM   Result Value Ref Range    Glucose (POC) 97 70 - 110 mg/dL   EKG, 12 LEAD, INITIAL    Collection Time: 11/08/18  1:54 AM   Result Value Ref Range    Ventricular Rate 64 BPM    Atrial Rate 64 BPM    P-R Interval 140 ms    QRS Duration 86 ms    Q-T Interval 442 ms    QTC Calculation (Bezet) 455 ms    Calculated P Axis 13 degrees    Calculated R Axis 22 degrees    Calculated T Axis 10 degrees    Diagnosis       Normal sinus rhythm  Normal ECG  When compared with ECG of 08-FEB-2017 09:46,  RSR' pattern in V1 is no longer present     URINALYSIS W/ RFLX MICROSCOPIC    Collection Time: 11/08/18  2:39 AM   Result Value Ref Range    Color YELLOW      Appearance CLEAR      Specific gravity 1.005 1.005 - 1.030      pH (UA) 6.5 5.0 - 8.0      Protein NEGATIVE  NEG mg/dL    Glucose NEGATIVE  NEG mg/dL    Ketone NEGATIVE  NEG mg/dL    Bilirubin NEGATIVE  NEG      Blood NEGATIVE  NEG      Urobilinogen 0.2 0.2 - 1.0 EU/dL    Nitrites NEGATIVE  NEG      Leukocyte Esterase NEGATIVE  NEG     CARDIAC PANEL,(CK, CKMB & TROPONIN)    Collection Time: 11/08/18  9:00 AM   Result Value Ref Range    CK 51 26 - 192 U/L    CK - MB 1.3 <3.6 ng/ml    CK-MB Index 2.5 0.0 - 4.0 %    Troponin-I, Qt. 0.23 (H) 0.0 - 0.045 NG/ML   ECHO ADULT COMPLETE    Collection Time: 11/08/18 11:07 AM   Result Value Ref Range    LA Volume 32.30 22 - 52 mL    LV E' Lateral Velocity 4.86 cm/s    LV E' Septal Velocity 4.75 cm/s    Ao Root D 2.98 cm    LVIDd 3.22 (A) 3.9 - 5.3 cm    LVPWd 1.28 (A) 0.6 - 0.9 cm    LVIDs 2.12 cm    IVSd 1.32 (A) 0.6 - 0.9 cm    LVOT d 1.84 cm    LVOT Peak Velocity 99.86 cm/s    LVOT Peak Gradient 4.0 mmHg    LVOT VTI 19.65 cm    MV A Alireza 121.55 cm/s    MV E Alireza 0.83 cm/s    MV E/A 0.01     LA Vol 4C 19.87 (A) 22 - 52 mL    LA Vol 2C 42.03 22 - 52 mL    LA Area 4C 11.6 cm2    LV Mass .5 67 - 162 g    LV Mass AL Index 105.8 43 - 95 g/m2    E/E' lateral 0.17     E/E' septal 0.17     E/E' ratio (averaged) 0.17     Mitral Valve E Wave Deceleration Time 337.6 ms    Pulmonary Vein \"A\" Wave Velocity 42.92 cm/s    P Vein A Dur 135.7 ms    LA Vol Index 21.84 16 - 28 ml/m2    LA Vol Index 28.42 16 - 28 ml/m2    LA Vol Index 13.44 16 - 28 ml/m2         Intake/Output Summary (Last 24 hours) at 11/8/2018 1130  Last data filed at 11/8/2018 0250  Gross per 24 hour   Intake    Output 500 ml   Net -500 ml       Cardiographics:     ECG: normal EKG, normal sinus rhythm    Echocardiogram: Not done      Signed By: Alea Thomson MD     November 8, 2018

## 2018-11-08 NOTE — ED TRIAGE NOTES
Patient reports to ED with complaints of \"flushed feeling\" and \"feeling fuzzy\" Patient also reports elevated BP.

## 2018-11-08 NOTE — ED PROVIDER NOTES
EMERGENCY DEPARTMENT HISTORY AND PHYSICAL EXAM 
 
2:16 AM 
 
 
Date: 11/8/2018 Patient Name: [facility], [place], operated by [facility] History of Presenting Illness Chief Complaint Patient presents with  Hypertension History Provided By:  
 
Chief Complaint: dizziness Patient reports to ED with complaints of \"flushed feeling\" and \"feeling fuzzy\" . She took her BP and it was elevated. She call paramedics who brought her to the ER. Additional History (Context): (-) chest pain, fever, chills, SOB 
 
PCP: Pippa Loja MD 
 
Current Outpatient Medications Medication Sig Dispense Refill  multivitamin (ONE A DAY) tablet Take 1 Tab by mouth daily.  fish oil-omega-3 fatty acids 340-1,000 mg capsule Take 1 Cap by mouth two (2) times a week. Indications: takes 1200 2 times weekly  calcium-cholecalciferol, d3, (CALCIUM 600 + D) 600-125 mg-unit tab Take 1,200 mg by mouth.  tamoxifen (NOLVADEX) 20 mg tablet Take 1 Tab by mouth daily. 90 Tab 3  
 oxyCODONE-acetaminophen (PERCOCET) 5-325 mg per tablet Take 1 Tab by mouth every six (6) hours as needed for Pain. Max Daily Amount: 4 Tabs. 30 Tab 0  
 aspirin delayed-release 81 mg tablet Take 81 mg by mouth daily.  ALPRAZolam (XANAX) 0.5 mg tablet TK 1 T PO ONCE A DAY FOR 30 DAYS  0  
 amLODIPine (NORVASC) 2.5 mg tablet TK 1 T PO  ONCE A DAY  3  
 atorvastatin (LIPITOR) 40 mg tablet TK 1 T PO  ONCE A DAY  2  
 clopidogrel (PLAVIX) 75 mg tab TK 1 T PO QD  2  
 levothyroxine (SYNTHROID) 25 mcg tablet TK 1 T PO QD IN THE MORNING OES  2 Past History Past Medical History: 
Past Medical History:  
Diagnosis Date  Breast CA ([address] 2017  
 left  High cholesterol  Hypertension  Sleep apnea   
 cpap  Thyroid disease  TIA (transient ischemic attack)   
 approx 8 years ago Past Surgical History: 
Past Surgical History:  
Procedure Laterality Date  HX HEENT Left   
 retina surgery  HX HYSTERECTOMY  [place] MASTECTOMY, SIMPLE, COMPLETE Left 02/21/2017 Dr. Mattson Records Family History: 
Family History Problem Relation Age of Onset  Cancer Mother 39 Throat Social History: 
Social History Tobacco Use  Smoking status: Former Smoker  Smokeless tobacco: Never Used Substance Use Topics  Alcohol use: Yes Comment: occasionally  Drug use: No  
 
 
Allergies:  NKA Review of Systems Review of Systems Constitutional: Negative. HENT: Negative. Eyes: Negative. Respiratory: Negative. Cardiovascular: Negative. Gastrointestinal: Negative. Endocrine: Negative. Genitourinary: Negative. Musculoskeletal: Negative. Skin: Negative. Allergic/Immunologic: Negative. Neurological: Positive for dizziness and light-headedness. Negative for tremors, syncope, facial asymmetry, speech difficulty, weakness, numbness and headaches. Hematological: Negative. Psychiatric/Behavioral: Positive for behavioral problems. All other systems reviewed and are negative. Physical Exam  
 
Visit Vitals /71 Pulse 62 Temp 97.5 °F (36.4 °C) Resp 16 Ht 5' (1.524 m) Wt 54 kg (119 lb) SpO2 99% BMI 23.24 kg/m² Physical Exam  
Constitutional: She is oriented to person, place, and time. She appears well-developed and well-nourished. No distress. HENT:  
Head: Normocephalic. Right Ear: External ear normal.  
Left Ear: External ear normal.  
Mouth/Throat: No oropharyngeal exudate. Eyes: Conjunctivae and EOM are normal. Pupils are equal, round, and reactive to light. Right eye exhibits no discharge. Left eye exhibits no discharge. No scleral icterus. Neck: Normal range of motion. Neck supple. No JVD present. No tracheal deviation present. No thyromegaly present. Cardiovascular: Normal rate, regular rhythm, normal heart sounds and intact distal pulses. Exam reveals no gallop and no friction rub. No murmur heard. Pulmonary/Chest: Effort normal and breath sounds normal. No stridor. No respiratory distress. She has no wheezes. She has no rales. She exhibits no tenderness. Abdominal: Soft. Bowel sounds are normal. She exhibits no distension and no mass. There is no tenderness. There is no rebound and no guarding. Musculoskeletal: Normal range of motion. She exhibits no edema or tenderness. Lymphadenopathy:  
  She has no cervical adenopathy. Neurological: She is alert and oriented to person, place, and time. She displays normal reflexes. No cranial nerve deficit. She exhibits normal muscle tone. Coordination normal.  
Skin: Skin is warm and dry. No rash noted. She is not diaphoretic. No erythema. No pallor. Nursing note and vitals reviewed. Diagnostic Study Results Labs - Recent Results (from the past 12 hour(s)) CBC WITH AUTOMATED DIFF Collection Time: 11/08/18  1:21 AM  
Result Value Ref Range WBC 6.3 4.6 - 13.2 K/uL  
 RBC 4.75 4.20 - 5.30 M/uL  
 HGB 15.2 12.0 - 16.0 g/dL HCT 44.9 35.0 - 45.0 % MCV 94.5 74.0 - 97.0 FL  
 MCH 32.0 24.0 - 34.0 PG  
 MCHC 33.9 31.0 - 37.0 g/dL  
 RDW 12.1 11.6 - 14.5 % PLATELET 993 506 - 254 K/uL MPV 9.6 9.2 - 11.8 FL  
 NEUTROPHILS 54 40 - 73 % LYMPHOCYTES 32 21 - 52 % MONOCYTES 12 (H) 3 - 10 % EOSINOPHILS 2 0 - 5 % BASOPHILS 0 0 - 2 %  
 ABS. NEUTROPHILS 3.3 1.8 - 8.0 K/UL  
 ABS. LYMPHOCYTES 2.0 0.9 - 3.6 K/UL  
 ABS. MONOCYTES 0.8 0.05 - 1.2 K/UL  
 ABS. EOSINOPHILS 0.1 0.0 - 0.4 K/UL  
 ABS. BASOPHILS 0.0 0.0 - 0.1 K/UL  
 DF AUTOMATED METABOLIC PANEL, COMPREHENSIVE Collection Time: 11/08/18  1:21 AM  
Result Value Ref Range Sodium 138 136 - 145 mmol/L Potassium 3.9 3.5 - 5.5 mmol/L Chloride 101 100 - 108 mmol/L  
 CO2 26 21 - 32 mmol/L Anion gap 11 3.0 - 18 mmol/L Glucose 94 74 - 99 mg/dL BUN 18 7.0 - 18 MG/DL  Creatinine 0.79 0.6 - 1.3 MG/DL  
 BUN/Creatinine ratio 23 (H) 12 - 20    
 GFR est AA >60 >60 ml/min/1.73m2 GFR est non-AA >60 >60 ml/min/1.73m2 Calcium 9.1 8.5 - 10.1 MG/DL Bilirubin, total PENDING MG/DL  
 ALT (SGPT) PENDING U/L  
 AST (SGOT) PENDING U/L Alk. phosphatase PENDING U/L Protein, total PENDING g/dL Albumin PENDING g/dL Globulin PENDING g/dL A-G Ratio PENDING    
GLUCOSE, POC Collection Time: 11/08/18  1:54 AM  
Result Value Ref Range Glucose (POC) 97 70 - 110 mg/dL EKG, 12 LEAD, INITIAL Collection Time: 11/08/18  1:54 AM  
Result Value Ref Range Ventricular Rate 64 BPM  
 Atrial Rate 64 BPM  
 P-R Interval 140 ms QRS Duration 86 ms  
 Q-T Interval 442 ms QTC Calculation (Bezet) 455 ms Calculated P Axis 13 degrees Calculated R Axis 22 degrees Calculated T Axis 10 degrees Diagnosis Normal sinus rhythm Normal ECG When compared with ECG of 08-FEB-2017 09:46, 
RSR' pattern in V1 is no longer present Radiologic Studies -   CxR - negative Medical Decision Making I am the first provider for this patient. I reviewed the vital signs, available nursing notes, past medical history, past surgical history, family history and social history. Vital Signs-Reviewed the patient's vital signs. Pulse Oximetry Analysis -  98%  On RA  Cardiac Monitor:  
 
EKG: Interpreted by the EP. Time Interpreted:  
  
ED Course: Progress Notes, Reevaluation, and Consults: 
Patient remained stable 02:30  Discussed case with Dr. Ron Whitfield. He accepted patient for admission. 03:00  Discussed case with Dr. Nancy Russo.  He will consult on patient in AM.  He recommend starting patient on lovenox. Provider Notes (Medical Decision Making): NSTEMI, HTN, anxiety, TIA, For Hospitalized Patients: 
 
1. Hospitalization Decision Time: 46  The decision to hospitalize the patient was made by Dr. Linda Bess on 11/8/2018. Patient to be admitted to telemetry. 2. Aspirin: given,   Nitropaste 1/2 inch given. Diagnosis Clinical Impression:  NSTEMI Disposition: ADMIT Follow-up Information None Medication List  
  
ASK your doctor about these medications ALPRAZolam 0.5 mg tablet Commonly known as:  XANAX 
  
amLODIPine 2.5 mg tablet Commonly known as:  NORVASC 
  
aspirin delayed-release 81 mg tablet 
  
atorvastatin 40 mg tablet Commonly known as:  LIPITOR 
  
CALCIUM 600 + D 600-125 mg-unit Tab Generic drug:  calcium-cholecalciferol (d3) 
  
clopidogrel 75 mg Tab Commonly known as:  PLAVIX 
  
fish oil-omega-3 fatty acids 340-1,000 mg capsule 
  
levothyroxine 25 mcg tablet Commonly known as:  SYNTHROID 
  
multivitamin tablet Commonly known as:  ONE A DAY 
  
oxyCODONE-acetaminophen 5-325 mg per tablet Commonly known as:  PERCOCET Take 1 Tab by mouth every six (6) hours as needed for Pain. Max Daily Amount: 4 Tabs. tamoxifen 20 mg tablet Commonly known as:  NOLVADEX Take 1 Tab by mouth daily. _______________________________ Scribe Attestation Archana Gongora acting as a scribe for and in the presence of Damaris Gonzalez MD     
November 08, 2018 at 2:16 AM 
    
Provider Attestation:     
I personally performed the services described in the documentation, reviewed the documentation, as recorded by the scribe in my presence, and it accurately and completely records my words and actions. November 08, 2018 at 2:16 AM - Damaris Gonzalez MD   
 
 
_______________________________

## 2018-11-09 ENCOUNTER — APPOINTMENT (OUTPATIENT)
Dept: NON INVASIVE DIAGNOSTICS | Age: 78
DRG: 305 | End: 2018-11-09
Attending: NURSE PRACTITIONER
Payer: MEDICARE

## 2018-11-09 VITALS
TEMPERATURE: 97.7 F | HEART RATE: 74 BPM | OXYGEN SATURATION: 98 % | DIASTOLIC BLOOD PRESSURE: 72 MMHG | RESPIRATION RATE: 18 BRPM | WEIGHT: 118.7 LBS | SYSTOLIC BLOOD PRESSURE: 149 MMHG | BODY MASS INDEX: 23.93 KG/M2 | HEIGHT: 59 IN

## 2018-11-09 PROBLEM — R42 DIZZINESS: Status: ACTIVE | Noted: 2018-11-09

## 2018-11-09 LAB
ALBUMIN SERPL-MCNC: 4 G/DL (ref 3.4–5)
ALBUMIN/GLOB SERPL: 1.4 {RATIO} (ref 0.8–1.7)
ALP SERPL-CCNC: 50 U/L (ref 45–117)
ALT SERPL-CCNC: 22 U/L (ref 13–56)
ANION GAP SERPL CALC-SCNC: 11 MMOL/L (ref 3–18)
AST SERPL-CCNC: 20 U/L (ref 15–37)
BASOPHILS # BLD: 0 K/UL (ref 0–0.1)
BASOPHILS NFR BLD: 1 % (ref 0–2)
BILIRUB SERPL-MCNC: 0.6 MG/DL (ref 0.2–1)
BUN SERPL-MCNC: 20 MG/DL (ref 7–18)
BUN/CREAT SERPL: 20 (ref 12–20)
CALCIUM SERPL-MCNC: 8.8 MG/DL (ref 8.5–10.1)
CHLORIDE SERPL-SCNC: 106 MMOL/L (ref 100–108)
CO2 SERPL-SCNC: 24 MMOL/L (ref 21–32)
CREAT SERPL-MCNC: 1 MG/DL (ref 0.6–1.3)
DIFFERENTIAL METHOD BLD: ABNORMAL
EOSINOPHIL # BLD: 0.1 K/UL (ref 0–0.4)
EOSINOPHIL NFR BLD: 2 % (ref 0–5)
ERYTHROCYTE [DISTWIDTH] IN BLOOD BY AUTOMATED COUNT: 12.1 % (ref 11.6–14.5)
GLOBULIN SER CALC-MCNC: 2.9 G/DL (ref 2–4)
GLUCOSE SERPL-MCNC: 104 MG/DL (ref 74–99)
HCT VFR BLD AUTO: 43.1 % (ref 35–45)
HGB BLD-MCNC: 14.8 G/DL (ref 12–16)
LYMPHOCYTES # BLD: 1.8 K/UL (ref 0.9–3.6)
LYMPHOCYTES NFR BLD: 27 % (ref 21–52)
MCH RBC QN AUTO: 31.9 PG (ref 24–34)
MCHC RBC AUTO-ENTMCNC: 34.3 G/DL (ref 31–37)
MCV RBC AUTO: 92.9 FL (ref 74–97)
MONOCYTES # BLD: 0.8 K/UL (ref 0.05–1.2)
MONOCYTES NFR BLD: 12 % (ref 3–10)
NEUTS SEG # BLD: 3.8 K/UL (ref 1.8–8)
NEUTS SEG NFR BLD: 58 % (ref 40–73)
NUC REST EJECTION FRACTION: 80 %
PLATELET # BLD AUTO: 273 K/UL (ref 135–420)
PMV BLD AUTO: 9.6 FL (ref 9.2–11.8)
POTASSIUM SERPL-SCNC: 4 MMOL/L (ref 3.5–5.5)
PROT SERPL-MCNC: 6.9 G/DL (ref 6.4–8.2)
RBC # BLD AUTO: 4.64 M/UL (ref 4.2–5.3)
SODIUM SERPL-SCNC: 141 MMOL/L (ref 136–145)
STRESS BASELINE DIAS BP: 72 MMHG
STRESS BASELINE HR: 65 BPM
STRESS BASELINE SYS BP: 138 MMHG
STRESS ESTIMATED WORKLOAD: 1 METS
STRESS EXERCISE DUR MIN: NORMAL
STRESS PEAK DIAS BP: 74 MMHG
STRESS PEAK SYS BP: 155 MMHG
STRESS PERCENT HR ACHIEVED: 91 %
STRESS POST PEAK HR: 111 BPM
STRESS RATE PRESSURE PRODUCT: NORMAL BPM*MMHG
STRESS ST DEPRESSION: 0 MM
STRESS ST ELEVATION: 0 MM
STRESS STAGE 1 DURATION: 1 MIN:SEC
STRESS STAGE 1 HR: 75 BPM
STRESS STAGE 2 DURATION: 1 MIN:SEC
STRESS STAGE 2 HR: 90 BPM
STRESS STAGE 3 BP: NORMAL MMHG
STRESS STAGE 3 DURATION: 1 MIN:SEC
STRESS STAGE 3 HR: 110 BPM
STRESS STAGE RECOVERY 1 DURATION: 1 MIN:SEC
STRESS STAGE RECOVERY 1 HR: 103 BPM
STRESS STAGE RECOVERY 2 BP: NORMAL MMHG
STRESS STAGE RECOVERY 2 DURATION: 1 MIN:SEC
STRESS STAGE RECOVERY 2 HR: 98 BPM
STRESS STAGE RECOVERY 3 BP: NORMAL MMHG
STRESS STAGE RECOVERY 3 DURATION: 1 MIN:SEC
STRESS STAGE RECOVERY 3 HR: 93 BPM
STRESS TARGET HR: 122 BPM
TID: 1
WBC # BLD AUTO: 6.6 K/UL (ref 4.6–13.2)

## 2018-11-09 PROCEDURE — 36415 COLL VENOUS BLD VENIPUNCTURE: CPT

## 2018-11-09 PROCEDURE — 85025 COMPLETE CBC W/AUTO DIFF WBC: CPT

## 2018-11-09 PROCEDURE — 74011250637 HC RX REV CODE- 250/637: Performed by: INTERNAL MEDICINE

## 2018-11-09 PROCEDURE — 99218 HC RM OBSERVATION: CPT

## 2018-11-09 PROCEDURE — 80053 COMPREHEN METABOLIC PANEL: CPT

## 2018-11-09 PROCEDURE — 74011250636 HC RX REV CODE- 250/636: Performed by: INTERNAL MEDICINE

## 2018-11-09 PROCEDURE — 74011250636 HC RX REV CODE- 250/636: Performed by: HOSPITALIST

## 2018-11-09 PROCEDURE — 78452 HT MUSCLE IMAGE SPECT MULT: CPT

## 2018-11-09 RX ORDER — AMLODIPINE BESYLATE 5 MG/1
5 TABLET ORAL DAILY
Qty: 30 TAB | Refills: 0 | Status: SHIPPED | OUTPATIENT
Start: 2018-11-10

## 2018-11-09 RX ADMIN — TAMOXIFEN CITRATE 20 MG: 10 TABLET, FILM COATED ORAL at 10:00

## 2018-11-09 RX ADMIN — LEVOTHYROXINE SODIUM 25 MCG: 25 TABLET ORAL at 10:00

## 2018-11-09 RX ADMIN — ASPIRIN 81 MG: 81 TABLET, COATED ORAL at 10:00

## 2018-11-09 RX ADMIN — CLOPIDOGREL BISULFATE 75 MG: 75 TABLET ORAL at 10:00

## 2018-11-09 RX ADMIN — REGADENOSON 0.4 MG: 0.08 INJECTION, SOLUTION INTRAVENOUS at 08:34

## 2018-11-09 RX ADMIN — AMLODIPINE BESYLATE 5 MG: 5 TABLET ORAL at 10:01

## 2018-11-09 RX ADMIN — THERA TABS 1 TABLET: TAB at 10:00

## 2018-11-09 NOTE — DISCHARGE SUMMARY
Discharge Summary    Patient: Francis Fernandez MRN: [id number]  CSN: [id number]    YOB: 1940  Age: 70 y.o. Sex: female    DOA: 11/8/2018 LOS:  LOS: 1 day   Discharge Date:      Admission Diagnoses: NSTEMI (non-ST elevated myocardial infarction) ([facility] [address]  Elevated troponin  Dizziness  Elevated troponin    Discharge Diagnoses:  PLEASE SEE DICTATION. Discharge Condition: Stable    PHYSICAL EXAM  Visit Vitals  /72 (BP 1 Location: Right arm, BP Patient Position: At rest)   Pulse 74   Temp 97.7 °F (36.5 °C)   Resp 18   Ht 4' 11\" (1.499 m)   Wt 53.8 kg (118 lb 11.2 oz)   SpO2 98%   BMI 23.97 kg/m²       General: Alert, cooperative, no acute distress    Lungs:  CTA Bilaterally. No Wheezing/Rhonchi/Rales. Heart:  Regular rate and Rhythm. Abdomen: Soft, Non distended, Non tender. + Bowel sounds. Extremities: No edema/ cyanosis/ clubbing  Psych:   Good insight. Not anxious or agitated. Neurologic:  AA oriented X 3. Moves all extremities. Hospital Course: Please see dictation. code # [id number]. Discharge Medications:     Current Discharge Medication List      CONTINUE these medications which have CHANGED    Details   amLODIPine (NORVASC) 5 mg tablet Take 1 Tab by mouth daily. Qty: 30 Tab, Refills: 0         CONTINUE these medications which have NOT CHANGED    Details   multivitamin (ONE A DAY) tablet Take 1 Tab by mouth daily. fish oil-omega-3 fatty acids 340-1,000 mg capsule Take 1 Cap by mouth two (2) times a week. Indications: takes 1200 2 times weekly      calcium-cholecalciferol, d3, (CALCIUM 600 + D) 600-125 mg-unit tab Take 1,200 mg by mouth. tamoxifen (NOLVADEX) 20 mg tablet Take 1 Tab by mouth daily. Qty: 90 Tab, Refills: 3      oxyCODONE-acetaminophen (PERCOCET) 5-325 mg per tablet Take 1 Tab by mouth every six (6) hours as needed for Pain. Max Daily Amount: 4 Tabs.   Qty: 30 Tab, Refills: 0      aspirin delayed-release 81 mg tablet Take 81 mg by mouth daily. ALPRAZolam (XANAX) 0.5 mg tablet TK 1 T PO ONCE A DAY FOR 30 DAYS  Refills: 0      atorvastatin (LIPITOR) 40 mg tablet TK 1 T PO  ONCE A DAY  Refills: 2      clopidogrel (PLAVIX) 75 mg tab TK 1 T PO QD  Refills: 2      levothyroxine (SYNTHROID) 25 mcg tablet TK 1 T PO QD IN THE MORNING OES  Refills: 2           · It is important that you take the medication exactly as they are prescribed. · Keep your medication in the bottles provided by the pharmacist and keep a list of the medication names, dosages, and times to be taken in your wallet. · Do not take other medications without consulting your doctor. DIET:  Cardiac Diet    ACTIVITY: Activity as tolerated    ADDITIONAL INFORMATION: If you experience any of the following symptoms but not limited to Fever, chills, nausea, vomiting, diarrhea, change in mentation, falling, bleeding, shortness of breath, chest pain, please call your primary care physician or return to the emergency room if you cannot get hold of your doctor:     FOLLOW UP CARE:  Dr. Kumar Rocha MD in 7-10 days. Please call and set up an appointment.   Dr. Barcenas Enter in 2 week    Minutes spent on discharge: 40 minutes spent coordinating this discharge (review instructions/follow-up, prescriptions, preparing report for sign off)    Boo Taylor MD  11/9/2018 3:39 PM

## 2018-11-09 NOTE — DISCHARGE SUMMARY
[facility]    Roxanna Verdugo  MR#: [id number]  : 1940  ACCOUNT #: [de-identified]   ADMIT DATE: 2018  DISCHARGE DATE: 2018    PRIMARY CARE PHYSICIAN:  Dr. Quinn Leon     DISPOSITION:  Discharged to home. DISCHARGE CONDITION:  Stable. DISCHARGE DIAGNOSES:  1. Accelerated hypertension improved now. 2.  Mildly elevated troponin, acute coronary syndrome ruled out with negative stress test.  3.  Headache, resolved now. 4.  Dyslipidemia. 5.  History of transient ischemic attack in the past.    DISCHARGE MEDICATIONS:  Xanax 0.5 mg daily, aspirin 81 mg daily, Lipitor 40 mg at bedtime, calcium with vitamin D 1 tablet daily, Plavix 75 mg daily, fish oil 1 capsule b.i.d., Synthroid 25 mcg daily, multivitamin 1 tablet daily, Percocet 1 tablet every 6 hours p.r.n., tamoxifen 20 mg daily. CONSULTATIONS IN THE HOSPITAL:  Consultation with Dr. Tania Leon, cardiology. PROCEDURES DONE [address] [place] [address]:  The patient underwent echocardiogram, which showed ejection fraction of 56-60%, no regional motion abnormalities. The patient also had a nuclear stress test, which preliminary results, per cardiology, is negative. HOSPITAL COURSE:  This is a 70-year-old  female who was noted to have high blood pressure at home, started feeling ill with a headache and fuzziness in her head, so decided to come to the ED. She did not have any signs of TIA or stroke. Denies any slurred speech, weakness, numbness anywhere in the body. In the emergency room, the patient was noted to have uncontrolled hypertension. Serial EKGs and cardiac enzymes were done. Cardiac enzymes were slightly high. Given the cardiac enzymes, cardiology was consulted and the patient was admitted to hospital under observation. With her blood pressure medications, her blood pressure improved.   Cardiac enzymes were monitored and trended down, which went max up to 0.27. Cardiology saw the patient and recommended echocardiogram and stress test.      Echo did not show any significant abnormality. Stress was done, which was within normal limits. Patient's symptoms completely resolved in the hospital and did not have any further complaints. Her amlodipine dose was increased in the hospital, after which her blood pressure has been well controlled. The patient has been ambulating without any problems. She is asymptomatic and hemodynamically stable for discharge. Cardiology cleared the patient for discharge for discharge. The patient will be discharged home with outpatient followup. For complete discharge instructions, followup appointments and physical exam, please refer to the previous discharge summary. Patient is alert, awake, and oriented. I discussed with the patient about the discharge plan and followup appointments. Discussed with her about medication changes and followup appointments. She completely understood and agreed with the plan. I answered all her questions and concerns completely. Her  is at the bedside at the time of my discharge instructions.       Beto Doty MD BT/TN  D: 11/09/2018 16:46     T: 11/09/2018 17:57  JOB #: [id number]  CC: Curlee Ahumada MD

## 2018-11-09 NOTE — PROGRESS NOTES
Pt states her  or her daughter will be picking her up 1728: Code 40 has been printed and faxed to Case Management office. Pt did not sign WALKER form because she already left.

## 2018-11-09 NOTE — ROUTINE PROCESS
Bedside verbal report received from  [facility], reviewed SBAR, VS, MAR, and summary of care. Patient awake and watching TV, denies dizziness and pain.

## 2018-11-09 NOTE — PROGRESS NOTES
Important Message from Medicare reviewed and explained with the patient and/or representative at bedside and signature obtained. A copy provided to patient/ representative. Original signed document placed on patients chart.

## 2018-11-09 NOTE — PHYSICIAN ADVISORY
Letter of admission status determination Lora Hyde Age: 70 y.o. MRN: [id number] [facility]:  [id number] Date of hospitalization: 11/8/2018 I have reviewed this case as it involves a Medicare patient admitted as inpatient that has not been hospitalized for at least two midnights and has a discharge order placed. Patient's condition and the documented plan of care at the time of admission do not fully support the expectation that the patient would require medically necessary hospital care for two or more midnights. Therefore, I recommend changing the hospitalization status to Outpatient. The final decision regarding the patient's hospitalization status depends on the attending physician's judgment. Kaya Coats MD, GINO, FACP, [facility] Physician Advisor [address] [place] [address],[address] [place] [address] [phone]

## 2018-11-09 NOTE — PROGRESS NOTES
Problem: Falls - Risk of 
Goal: *Absence of Falls Document Delisa Paz Fall Risk and appropriate interventions in the flowsheet. Outcome: Progressing Towards Goal 
Fall Risk Interventions: 
  
 
  
 
Medication Interventions: Patient to call before getting OOB, Teach patient to arise slowly

## 2018-11-09 NOTE — PROGRESS NOTES
[facility] 
 
 
CARDIOLOGY PROGRESS NOTE 
RECS: 
1) elevated troponin-  no active chest pain. ekg no ischemia. Continue current meds. EF normal. No ischemia on stress test.  
2) htn 
3) h/o TIA on aspirin and plavix 4) dyslipidemia 5) hypothyroidism. Continue current meds Can be discharged from cardiac standpoint. F/u in clinic in about 2 weeks ASSESSMENT: 
Hospital Problems  Date Reviewed: 10/31/2018 Codes Class Noted POA  
 NSTEMI (non-ST elevated myocardial infarction) ([facility] [address] ICD-10-CM: I21.4 ICD-9-CM: 410.70  11/8/2018 Unknown Elevated troponin ICD-10-CM: R74.8 ICD-9-CM: 790.6  11/8/2018 Unknown SUBJECTIVE: 
No CP or SOB OBJECTIVE: 
 
VS:  
Visit Vitals /68 (BP 1 Location: Right arm, BP Patient Position: At rest) Pulse 60 Temp 98 °F (36.7 °C) Resp 18 Ht 4' 11\" (1.499 m) Wt 53.8 kg (118 lb 11.2 oz) SpO2 97% BMI 23.97 kg/m² Intake/Output Summary (Last 24 hours) at 11/9/2018 1425 Last data filed at 11/9/2018 0400 Gross per 24 hour Intake 240 ml Output 300 ml Net -60 ml  
 
TELE: normal sinus rhythm General: in no apparent distress HENT: Normocephalic, atraumatic. Normal external eye. Neck :  negative Cardiac:  regular rate and rhythm, S1, S2 normal, no murmur, click, rub or gallop Lungs: clear to auscultation bilaterally Abdomen: Soft, nontender, no masses Extremities:  No edema Labs: Results:  
   
Chemistry Recent Labs 11/09/18 
0250 11/08/18 
0121 * 94  138  
K 4.0 3.9  101 CO2 24 26 BUN 20* 18  
CREA 1.00 0.79 CA 8.8 9.1 AGAP 11 11 BUCR 20 23* AP 50 54 TP 6.9 7.2 ALB 4.0 4.0  
GLOB 2.9 3.2 AGRAT 1.4 1.3  
  
CBC w/Diff Recent Labs 11/09/18 
0250 11/08/18 
0121 WBC 6.6 6.3  
RBC 4.64 4.75 HGB 14.8 15.2 HCT 43.1 44.9  269 GRANS 58 54 LYMPH 27 32 EOS 2 2 Cardiac Enzymes Recent Labs 11/08/18 0900 11/08/18 
0121 CPK 51 64 CKND1 2.5 CALCULATION NOT PERFORMED WHEN RESULT IS BELOW LINEAR LIMIT Coagulation Recent Labs 11/08/18 
1301 PTP 12.5 INR 1.0 Lipid Panel No results found for: CHOL, CHOLPOCT, CHOLX, CHLST, CHOLV, 882291, HDL, LDL, LDLC, DLDLP, 679044, VLDLC, VLDL, TGLX, TRIGL, TRIGP, TGLPOCT, CHHD, CHHDX  
BNP No results for input(s): BNPP in the last 72 hours. Liver Enzymes Recent Labs 11/09/18 
0250 TP 6.9 ALB 4.0 AP 50 SGOT 20 Thyroid Studies No results found for: T4, T3U, TSH, TSHEXT    
 
 
 
Wendy Thomas MD

## 2018-11-09 NOTE — ROUTINE PROCESS
Bedside and Verbal shift change report given to Kim W Karyna Fair (oncoming nurse) by Nely Pinto RN (offgoing nurse). Report included the following information SBAR, Kardex, MAR, Recent Results and Cardiac Rhythm SB/SR.

## 2018-11-09 NOTE — PROGRESS NOTES
[facility] 
 
 
CARDIOLOGY PROGRESS NOTE 
RECS: 
 
1) elevated troponin- 0.27, 0.23, 0.21 - flat not consistent with ACS- no active chest pain. ekg no ischemia. Continue current meds. Echo with EF - 56-60%. NST today. 2) htn -controlled 3) h/o TIA on aspirin and plavix 4) dyslipidemia - on statin 5) hypothyroidism. 
  
Further recommendations to follow when NST resulted. 11/8/2018 - Echo · Estimated left ventricular ejection fraction is 56 - 60%. Visually measured ejection fraction. Left ventricular mild concentric hypertrophy. Normal left ventricular wall motion, no regional wall motion abnormality noted. · Mild to moderate pulmonic valve regurgitation is present. ASSESSMENT: 
Hospital Problems  Date Reviewed: 10/31/2018 Codes Class Noted POA  
 NSTEMI (non-ST elevated myocardial infarction) ([facility] [address] ICD-10-CM: I21.4 ICD-9-CM: 410.70  11/8/2018 Unknown Elevated troponin ICD-10-CM: R74.8 ICD-9-CM: 790.6  11/8/2018 Unknown SUBJECTIVE: 
No CP or SOB OBJECTIVE: 
 
VS:  
Visit Vitals /66 (BP 1 Location: Right arm, BP Patient Position: At rest) Pulse 61 Temp 97.4 °F (36.3 °C) Resp 18 Ht 4' 11\" (1.499 m) Wt 53.8 kg (118 lb 11.2 oz) SpO2 96% BMI 23.97 kg/m² Intake/Output Summary (Last 24 hours) at 11/9/2018 2342 Last data filed at 11/9/2018 0400 Gross per 24 hour Intake 240 ml Output 300 ml Net -60 ml  
 
TELE: normal sinus rhythm General: alert, cooperative and in no apparent distress HENT: Normocephalic, atraumatic. Normal external eye. Neck :  no JVD Cardiac:  regular rate and rhythm, S1, S2 normal, no murmur, click, rub or gallop Lungs: clear to auscultation bilaterally Abdomen: Soft, nontender, no masses Extremities:  No c/c/e, peripheral pulses present Labs: Results:  
   
Chemistry Recent Labs 11/09/18 
0250 11/08/18 
0121 * 94  138  
K 4.0 3.9  101 CO2 24 26 BUN 20* 18  
CREA 1.00 0.79 CA 8.8 9.1 AGAP 11 11 BUCR 20 23* AP 50 54 TP 6.9 7.2 ALB 4.0 4.0  
GLOB 2.9 3.2 AGRAT 1.4 1.3  
  
CBC w/Diff Recent Labs 11/09/18 
0250 11/08/18 
0121 WBC 6.6 6.3  
RBC 4.64 4.75 HGB 14.8 15.2 HCT 43.1 44.9  269 GRANS 58 54 LYMPH 27 32 EOS 2 2 Cardiac Enzymes Recent Labs 11/08/18 
0900 11/08/18 
0121 CPK 51 64 CKND1 2.5 CALCULATION NOT PERFORMED WHEN RESULT IS BELOW LINEAR LIMIT Coagulation Recent Labs 11/08/18 
1301 PTP 12.5 INR 1.0 Lipid Panel No results found for: CHOL, CHOLPOCT, CHOLX, CHLST, CHOLV, 890153, HDL, LDL, LDLC, DLDLP, 284919, VLDLC, VLDL, TGLX, TRIGL, TRIGP, TGLPOCT, CHHD, CHHDX  
BNP No results for input(s): BNPP in the last 72 hours. Liver Enzymes Recent Labs 11/09/18 
0250 TP 6.9 ALB 4.0 AP 50 SGOT 20 Thyroid Studies No results found for: T4, T3U, TSH, TSHEXT Jodee Martínez NP   Pager # [phone]

## 2018-11-09 NOTE — PROGRESS NOTES
Patient was given 11. 0mCi of 99mTc Sestamibi for the resting images. Patient was also given 33. 0mCi 99mTc Sestamibi for the stress images Injected 0.4mg Lexiscan.

## 2018-11-09 NOTE — PROGRESS NOTES
D/C patient home escorted by  with discharge instructions and follow up appt. No complaint of pain at this time. IV and telemetry box removed

## 2019-04-22 RX ORDER — TAMOXIFEN CITRATE 20 MG/1
20 TABLET ORAL DAILY
Qty: 90 TAB | Refills: 3 | Status: SHIPPED | OUTPATIENT
Start: 2019-04-22 | End: 2020-03-18 | Stop reason: SDUPTHER

## 2019-05-01 ENCOUNTER — HOSPITAL ENCOUNTER (OUTPATIENT)
Dept: MAMMOGRAPHY | Age: 79
Discharge: HOME | End: 2019-05-01
Payer: MEDICARE

## 2019-05-01 DIAGNOSIS — Z85.3 PERSONAL HISTORY OF BREAST CANCER: ICD-10-CM

## 2019-05-01 PROCEDURE — 77061 BREAST TOMOSYNTHESIS UNI: CPT

## 2019-05-08 ENCOUNTER — OFFICE VISIT (OUTPATIENT)
Dept: SURGERY | Age: 79
End: 2019-05-08

## 2019-05-08 VITALS
HEART RATE: 70 BPM | TEMPERATURE: 97.1 F | SYSTOLIC BLOOD PRESSURE: 118 MMHG | DIASTOLIC BLOOD PRESSURE: 61 MMHG | OXYGEN SATURATION: 99 % | WEIGHT: 123 LBS | HEIGHT: 59 IN | RESPIRATION RATE: 18 BRPM | BODY MASS INDEX: 24.8 KG/M2

## 2019-05-08 DIAGNOSIS — Z85.3 PERSONAL HISTORY OF BREAST CANCER: Primary | ICD-10-CM

## 2019-05-08 NOTE — PROGRESS NOTES
Chief Complaint   Patient presents with    Follow-up     right 3D ,a,,p dx. Birads 2 benign. Hx Left mastectomy/breast ca     Pt states no breast issues at this time.

## 2019-05-08 NOTE — PROGRESS NOTES
Progress Note    Patient: Tone Dumont  MRN: [id number]  SSN: [id number]   YOB: 1940  Age: 70 y.o. Sex: female     Chief Complaint   Patient presents with    Follow-up     right 3D ,a,,p dx. Birads 2 benign. Hx Left mastectomy/breast ca       HPI    Ms. Jaycob Haney is a 65-year-old woman who in February 2017 I did a left mastectomy on for a T2 N0 M0 hormone positive HER-2 negative left breast cancer. She is done well ever since and today is no has a normal mammogram well-healed left breast mastectomy scar without evidence of recurrence. Past Medical History:   Diagnosis Date    Breast CA ([facility] [address] 2017    left     High cholesterol     Hypertension     Sleep apnea     cpap    Thyroid disease     TIA (transient ischemic attack)     approx 8 years ago     Past Surgical History:   Procedure Laterality Date    COLONOSCOPY N/A 6/5/2018    COL[facility]COPY performed by Karen Falcon MD at [facility]COPY    HX HEENT Left     retina surgery    HX HYSTERECTOMY      [place] MASTECTOMY, SIMPLE, COMPLETE Left 02/21/2017    Dr. Jesus Dong     No Known Allergies  Current Outpatient Medications   Medication Sig Dispense Refill    tamoxifen (NOLVADEX) 20 mg tablet Take 1 Tab by mouth daily. 90 Tab 3    amLODIPine (NORVASC) 5 mg tablet Take 1 Tab by mouth daily. 30 Tab 0    fish oil-omega-3 fatty acids 340-1,000 mg capsule Take 1 Cap by mouth two (2) times a week. Indications: takes 1200 2 times weekly      calcium-cholecalciferol, d3, (CALCIUM 600 + D) 600-125 mg-unit tab Take 1,200 mg by mouth.  aspirin delayed-release 81 mg tablet Take 81 mg by mouth daily.  ALPRAZolam (XANAX) 0.5 mg tablet TK 1 T PO ONCE A DAY FOR 30 DAYS  0    atorvastatin (LIPITOR) 40 mg tablet TK 1 T PO  ONCE A DAY  2    clopidogrel (PLAVIX) 75 mg tab TK 1 T PO QD  2    levothyroxine (SYNTHROID) 25 mcg tablet TK 1 T PO QD IN THE MORNING OES  2    multivitamin (ONE A DAY) tablet Take 1 Tab by mouth daily.       oxyCODONE-acetaminophen (PERCOCET) 5-325 mg per tablet Take 1 Tab by mouth every six (6) hours as needed for Pain. Max Daily Amount: 4 Tabs.  27 Tab 0     Social History     Socioeconomic History    Marital status:      Spouse name: Not on file    Number of children: Not on file    Years of education: Not on file    Highest education level: Not on file   Occupational History    Not on file   Social Needs    Financial resource strain: Not on file    Food insecurity:     Worry: Not on file     Inability: Not on file    Transportation needs:     Medical: Not on file     Non-medical: Not on file   Tobacco Use    Smoking status: Former Smoker    Smokeless tobacco: Never Used   Substance and Sexual Activity    Alcohol use: Yes     Comment: occasionally    Drug use: No    Sexual activity: Not on file   Lifestyle    Physical activity:     Days per week: Not on file     Minutes per session: Not on file    Stress: Not on file   Relationships    Social connections:     Talks on phone: Not on file     Gets together: Not on file     Attends Spiritism service: Not on file     Active member of club or organization: Not on file     Attends meetings of clubs or organizations: Not on file     Relationship status: Not on file    Intimate partner violence:     Fear of current or ex partner: Not on file     Emotionally abused: Not on file     Physically abused: Not on file     Forced sexual activity: Not on file   Other Topics Concern    Not on file   Social History Narrative    Not on file     Family History   Problem Relation Age of Onset    Cancer Mother 39        Throat         Review of systems:  Patient denies any reflux, emesis, abdominal pain, change in bowel habits, hematochezia, melena, fever, weight loss, fatigue chills, dermatitis, abnormal moles, change in vision, vertigo, epistaxis, dysphagia, hoarseness, chest pain, palpitations, hypertension, edema, cough, shortness of breath, wheezing, hemoptysis, snoring, hematuria, diabetes, thyroid disease, anemia, bruising, history of blood transfusion, dizziness, headache, or fainting. Physical Examination    Well developed well nourished female in no apparent distress  Visit Vitals  /61   Pulse 70   Temp 97.1 °F (36.2 °C) (Oral)   Resp 18   Ht 4' 11\" (1.499 m)   Wt 55.8 kg (123 lb)   SpO2 99% Comment: room air   BMI 24.84 kg/m²      Head: normocephalic, atraumatic  Mouth: Clear, no overt lesions, oral mucosa pink and moist  Neck: supple, no masses, no adenopathy or carotid bruits, trachea midline  Resp: clear to auscultation bilaterally, no wheeze, rhonchi or rales, excursions normal and symmetrical  Cardio: Regular rate and rhythm, no murmurs, clicks, gallops or rubs, no edema or varicosities  Abdomen: soft, nontender, nondistended, normoactive bowel sounds, no hernias, no hepatosplenomegaly,   Back: Deferred  Extremeties: warm, well-perfused, no tenderness or swelling, normal gait/station  Neuro: sensation and strength grossly intact and symmetrical  Psych: alert and oriented to person, place and time  Breast exam normal exam right breast.  No evidence of recurrence left mastectomy scar. No lymphadenopathy bilaterally    IMPRESSION  Stable mastectomy scar and mammogram since left mastectomy. No evidence of recurrence    PLAN  No orders of the defined types were placed in this encounter.     Follow-up in 6 months  Ivette Bledsoe MD

## 2019-05-28 ENCOUNTER — HOSPITAL ENCOUNTER (OUTPATIENT)
Dept: VASCULAR SURGERY | Age: 79
Discharge: HOME | End: 2019-05-28
Attending: INTERNAL MEDICINE
Payer: MEDICARE

## 2019-05-28 DIAGNOSIS — I73.9 PERIPHERAL VASCULAR DISEASE, UNSPECIFIED (HCC): ICD-10-CM

## 2019-05-28 PROCEDURE — 93880 EXTRACRANIAL BILAT STUDY: CPT

## 2019-05-29 LAB
LEFT CCA DIST DIAS: 15.4 CM/S
LEFT CCA DIST SYS: 64.6 CM/S
LEFT CCA MID DIAS: 12.81 CM/S
LEFT CCA MID SYS: 77.52 CM/S
LEFT CCA PROX DIAS: 14.1 CM/S
LEFT CCA PROX SYS: 68.5 CM/S
LEFT ECA DIAS: 0 CM/S
LEFT ECA SYS: 109.7 CM/S
LEFT ICA DIST DIAS: 12 CM/S
LEFT ICA DIST SYS: 51.6 CM/S
LEFT ICA MID DIAS: 15 CM/S
LEFT ICA MID SYS: 57.7 CM/S
LEFT ICA PROX DIAS: 11.5 CM/S
LEFT ICA PROX SYS: 46.5 CM/S
LEFT ICA/CCA SYS: 0.89
LEFT VERTEBRAL DIAS: 15.99 CM/S
LEFT VERTEBRAL SYS: 80.6 CM/S
RIGHT CCA DIST DIAS: 10.9 CM/S
RIGHT CCA DIST SYS: 80.6 CM/S
RIGHT CCA MID DIAS: 19.23 CM/S
RIGHT CCA MID SYS: 100.07 CM/S
RIGHT CCA PROX DIAS: 19.2 CM/S
RIGHT CCA PROX SYS: 91.7 CM/S
RIGHT ECA DIAS: 0 CM/S
RIGHT ECA SYS: 174.7 CM/S
RIGHT ICA DIST DIAS: 12.5 CM/S
RIGHT ICA DIST SYS: 65.7 CM/S
RIGHT ICA MID DIAS: 16.5 CM/S
RIGHT ICA MID SYS: 66.6 CM/S
RIGHT ICA PROX DIAS: 10.9 CM/S
RIGHT ICA PROX SYS: 52.7 CM/S
RIGHT ICA/CCA SYS: 0.8
RIGHT VERTEBRAL DIAS: 16.77 CM/S
RIGHT VERTEBRAL SYS: 60.5 CM/S

## 2019-07-03 ENCOUNTER — HOSPITAL ENCOUNTER (OUTPATIENT)
Dept: CT IMAGING | Age: 79
Discharge: HOME | End: 2019-07-03
Attending: INTERNAL MEDICINE
Payer: MEDICARE

## 2019-07-03 DIAGNOSIS — R10.32 LLQ ABDOMINAL PAIN: ICD-10-CM

## 2019-07-03 DIAGNOSIS — K56.3 GALLSTONE ILEUS (HCC): ICD-10-CM

## 2019-07-03 DIAGNOSIS — A09 INFECTIOUS COLITIS, ENTERITIS, AND GASTROENTERITIS: ICD-10-CM

## 2019-07-03 LAB — CREAT UR-MCNC: 0.5 MG/DL (ref 0.6–1.3)

## 2019-07-03 PROCEDURE — 74011636320 HC RX REV CODE- 636/320: Performed by: INTERNAL MEDICINE

## 2019-07-03 PROCEDURE — 82565 ASSAY OF CREATININE: CPT

## 2019-07-03 PROCEDURE — 74177 CT ABD & PELVIS W/CONTRAST: CPT

## 2019-07-03 RX ADMIN — IOPAMIDOL 100 ML: 612 INJECTION, SOLUTION INTRAVENOUS at 15:33

## 2019-10-30 ENCOUNTER — HOSPITAL ENCOUNTER (OUTPATIENT)
Dept: MAMMOGRAPHY | Age: 79
Discharge: HOME | End: 2019-10-30
Payer: MEDICARE

## 2019-10-30 DIAGNOSIS — Z85.3 PERSONAL HISTORY OF BREAST CANCER: ICD-10-CM

## 2019-10-30 PROCEDURE — 77061 BREAST TOMOSYNTHESIS UNI: CPT

## 2019-11-20 ENCOUNTER — OFFICE VISIT (OUTPATIENT)
Dept: SURGERY | Age: 79
End: 2019-11-20

## 2019-11-20 VITALS
DIASTOLIC BLOOD PRESSURE: 70 MMHG | HEART RATE: 72 BPM | BODY MASS INDEX: 24.8 KG/M2 | SYSTOLIC BLOOD PRESSURE: 120 MMHG | HEIGHT: 59 IN | OXYGEN SATURATION: 98 % | RESPIRATION RATE: 16 BRPM | WEIGHT: 123 LBS

## 2019-11-20 DIAGNOSIS — Z85.3 PERSONAL HISTORY OF BREAST CANCER: Primary | ICD-10-CM

## 2019-11-20 NOTE — PROGRESS NOTES
Progress Note    Patient: Vic Pérez  MRN: [id number]  SSN: [id number]   YOB: 1940  Age: 70 y.o. Sex: female     Chief Complaint   Patient presents with    Follow-up     mammo birads        HPI    72-year in 2017 had a left mastectomy for a T2 N0 M0 hormone positive HER-2 negative breast cancer. She is done well ever since and today is no exception she is back with an normal right mammogram and her left breast mastectomy site appears well-healed and without recurrence. Is continuing to take Nolvadex without difficulty. He is doing quite well and we will schedule her for a six-month follow-up with a mammogram.    Past Medical History:   Diagnosis Date    Breast CA ([address] 2017    left     High cholesterol     Hypertension     Sleep apnea     cpap    Thyroid disease     TIA (transient ischemic attack)     approx 8 years ago     Past Surgical History:   Procedure Laterality Date    COLONOSCOPY N/A 6/5/2018    COLONOSCOPY performed by Juan Carlos Mae MD at [facility] ENDOSCOPY    HX HEENT Left     retina surgery    HX HYSTERECTOMY      HX MASTECTOMY Left     HI MASTECTOMY, SIMPLE, COMPLETE Left 02/21/2017    Dr. Chemo Kirkland     No Known Allergies  Current Outpatient Medications   Medication Sig Dispense Refill    tamoxifen (NOLVADEX) 20 mg tablet Take 1 Tab by mouth daily. 90 Tab 3    amLODIPine (NORVASC) 5 mg tablet Take 1 Tab by mouth daily. 30 Tab 0    multivitamin (ONE A DAY) tablet Take 1 Tab by mouth daily.  fish oil-omega-3 fatty acids 340-1,000 mg capsule Take 1 Cap by mouth two (2) times a week. Indications: takes 1200 2 times weekly      calcium-cholecalciferol, d3, (CALCIUM 600 + D) 600-125 mg-unit tab Take 1,200 mg by mouth.  aspirin delayed-release 81 mg tablet Take 81 mg by mouth daily.       ALPRAZolam (XANAX) 0.5 mg tablet TK 1 T PO ONCE A DAY FOR 30 DAYS  0    atorvastatin (LIPITOR) 40 mg tablet TK 1 T PO  ONCE A DAY  2    clopidogrel (PLAVIX) 75 mg tab TK 1 T PO QD  2    levothyroxine (SYNTHROID) 25 mcg tablet TK 1 T PO QD IN THE MORNING OES  2    oxyCODONE-acetaminophen (PERCOCET) 5-325 mg per tablet Take 1 Tab by mouth every six (6) hours as needed for Pain. Max Daily Amount: 4 Tabs.  27 Tab 0     Social History     Socioeconomic History    Marital status:      Spouse name: Not on file    Number of children: Not on file    Years of education: Not on file    Highest education level: Not on file   Occupational History    Not on file   Social Needs    Financial resource strain: Not on file    Food insecurity:     Worry: Not on file     Inability: Not on file    Transportation needs:     Medical: Not on file     Non-medical: Not on file   Tobacco Use    Smoking status: Former Smoker    Smokeless tobacco: Never Used   Substance and Sexual Activity    Alcohol use: Yes     Comment: occasionally    Drug use: No    Sexual activity: Not on file   Lifestyle    Physical activity:     Days per week: Not on file     Minutes per session: Not on file    Stress: Not on file   Relationships    Social connections:     Talks on phone: Not on file     Gets together: Not on file     Attends Catholic service: Not on file     Active member of club or organization: Not on file     Attends meetings of clubs or organizations: Not on file     Relationship status: Not on file    Intimate partner violence:     Fear of current or ex partner: Not on file     Emotionally abused: Not on file     Physically abused: Not on file     Forced sexual activity: Not on file   Other Topics Concern    Not on file   Social History Narrative    Not on file     Family History   Problem Relation Age of Onset    Cancer Mother 39        Throat         Review of systems:  Patient denies any reflux, emesis, abdominal pain, change in bowel habits, hematochezia, melena, fever, weight loss, fatigue chills, dermatitis, abnormal moles, change in vision, vertigo, epistaxis, dysphagia, hoarseness, chest pain, palpitations, hypertension, edema, cough, shortness of breath, wheezing, hemoptysis, snoring, hematuria, diabetes, thyroid disease, anemia, bruising, history of blood transfusion, dizziness, headache, or fainting. Physical Examination    Well developed well nourished female in no apparent distress  Visit Vitals  /70   Pulse 72   Resp 16   Ht 4' 11\" (1.499 m)   Wt 55.8 kg (123 lb)   SpO2 98%   BMI 24.84 kg/m²      Head: normocephalic, atraumatic  Mouth: Clear, no overt lesions, oral mucosa pink and moist  Neck: supple, no masses, no adenopathy or carotid bruits, trachea midline  Resp: clear to auscultation bilaterally, no wheeze, rhonchi or rales, excursions normal and symmetrical  Cardio: Regular rate and rhythm, no murmurs, clicks, gallops or rubs, no edema or varicosities  Abdomen: soft, nontender, nondistended, normoactive bowel sounds, no hernias, no hepatosplenomegaly,   Back: Deferred  Extremeties: warm, well-perfused, no tenderness or swelling, normal gait/station  Neuro: sensation and strength grossly intact and symmetrical  Psych: alert and oriented to person, place and time  Breast exam right breast without dominant mass, skin change, nipple discharge or lymphadenopathy. Left mastectomy site without evidence of recurrence or lymphadenopathy    IMPRESSION  Stable exam and mammogram now just at 2 years postop    PLAN  No orders of the defined types were placed in this encounter.     Aloe up in 6 months with mammogram  Wendy Ventura MD

## 2019-12-18 ENCOUNTER — OFFICE VISIT (OUTPATIENT)
Dept: SURGERY | Age: 79
End: 2019-12-18

## 2019-12-18 VITALS — BODY MASS INDEX: 24.8 KG/M2 | WEIGHT: 123 LBS | RESPIRATION RATE: 16 BRPM | HEIGHT: 59 IN

## 2019-12-18 DIAGNOSIS — Z85.3 PERSONAL HISTORY OF BREAST CANCER: Primary | ICD-10-CM

## 2019-12-18 NOTE — PROGRESS NOTES
Progress Note    Patient: Megan Jarquin  MRN: [id number]  SSN: [id number]   YOB: 1940  Age: 70 y.o. Sex: female     Chief Complaint   Patient presents with    Breast Problem     spots on chest       HPI    Ms. Eben pillai is a 68-year-old lady that I follow for left breast cancer in 2017. She had several small erythematous skin macules that I had noticed last visit and while they were not too concerning for recurrence nodules I wanted to least take 1 last look at her    Past Medical History:   Diagnosis Date    Breast CA ([address] 2017    left     High cholesterol     Hypertension     Sleep apnea     cpap    Thyroid disease     TIA (transient ischemic attack)     approx 8 years ago     Past Surgical History:   Procedure Laterality Date    COLONOSCOPY N/A 6/5/2018    COLONOSCOPY performed by Elana Rahman MD at [facility] ENDOSCOPY    HX HEENT Left     retina surgery    HX HYSTERECTOMY      HX MASTECTOMY Left     [place] MASTECTOMY, SIMPLE, COMPLETE Left 02/21/2017    Dr. Sarita Browne     No Known Allergies  Current Outpatient Medications   Medication Sig Dispense Refill    tamoxifen (NOLVADEX) 20 mg tablet Take 1 Tab by mouth daily. 90 Tab 3    amLODIPine (NORVASC) 5 mg tablet Take 1 Tab by mouth daily. 30 Tab 0    multivitamin (ONE A DAY) tablet Take 1 Tab by mouth daily.  fish oil-omega-3 fatty acids 340-1,000 mg capsule Take 1 Cap by mouth two (2) times a week. Indications: takes 1200 2 times weekly      calcium-cholecalciferol, d3, (CALCIUM 600 + D) 600-125 mg-unit tab Take 1,200 mg by mouth.  aspirin delayed-release 81 mg tablet Take 81 mg by mouth daily.       ALPRAZolam (XANAX) 0.5 mg tablet TK 1 T PO ONCE A DAY FOR 30 DAYS  0    atorvastatin (LIPITOR) 40 mg tablet TK 1 T PO  ONCE A DAY  2    clopidogrel (PLAVIX) 75 mg tab TK 1 T PO QD  2    levothyroxine (SYNTHROID) 25 mcg tablet TK 1 T PO QD IN THE MORNING OES  2    oxyCODONE-acetaminophen (PERCOCET) 5-325 mg per tablet Take 1 Tab by mouth every six (6) hours as needed for Pain. Max Daily Amount: 4 Tabs.  27 Tab 0     Social History     Socioeconomic History    Marital status:      Spouse name: Not on file    Number of children: Not on file    Years of education: Not on file    Highest education level: Not on file   Occupational History    Not on file   Social Needs    Financial resource strain: Not on file    Food insecurity:     Worry: Not on file     Inability: Not on file    Transportation needs:     Medical: Not on file     Non-medical: Not on file   Tobacco Use    Smoking status: Former Smoker    Smokeless tobacco: Never Used   Substance and Sexual Activity    Alcohol use: Yes     Comment: occasionally    Drug use: No    Sexual activity: Not on file   Lifestyle    Physical activity:     Days per week: Not on file     Minutes per session: Not on file    Stress: Not on file   Relationships    Social connections:     Talks on phone: Not on file     Gets together: Not on file     Attends Mandaeism service: Not on file     Active member of club or organization: Not on file     Attends meetings of clubs or organizations: Not on file     Relationship status: Not on file    Intimate partner violence:     Fear of current or ex partner: Not on file     Emotionally abused: Not on file     Physically abused: Not on file     Forced sexual activity: Not on file   Other Topics Concern    Not on file   Social History Narrative    Not on file     Family History   Problem Relation Age of Onset    Cancer Mother 39        Throat         Review of systems:  Patient denies any reflux, emesis, abdominal pain, change in bowel habits, hematochezia, melena, fever, weight loss, fatigue chills, dermatitis, abnormal moles, change in vision, vertigo, epistaxis, dysphagia, hoarseness, chest pain, palpitations, hypertension, edema, cough, shortness of breath, wheezing, hemoptysis, snoring, hematuria, diabetes, thyroid disease, anemia, bruising, history of blood transfusion, dizziness, headache, or fainting. Physical Examination    Well developed well nourished female in no apparent distress  Visit Vitals  Resp 16   Ht 4' 11\" (1.499 m)   Wt 55.8 kg (123 lb)   BMI 24.84 kg/m²      Head: normocephalic, atraumatic  Mouth: Clear, no overt lesions, oral mucosa pink and moist  Neck: supple, no masses, no adenopathy or carotid bruits, trachea midline  Resp: clear to auscultation bilaterally, no wheeze, rhonchi or rales, excursions normal and symmetrical  Cardio: Regular rate and rhythm, no murmurs, clicks, gallops or rubs, no edema or varicosities  Abdomen: soft, nontender, nondistended, normoactive bowel sounds, no hernias, no hepatosplenomegaly,   Back: Deferred  Extremeties: warm, well-perfused, no tenderness or swelling, normal gait/station  Neuro: sensation and strength grossly intact and symmetrical  Psych: alert and oriented to person, place and time  Breast exam left breast wound mastectomy site without evidence of recurrence or lymphadenopathy    IMPRESSION  Stable mammogram and exam    PLAN  No orders of the defined types were placed in this encounter.     Follow-up 6 months for exam  Lauren Willson MD

## 2020-03-18 RX ORDER — TAMOXIFEN CITRATE 20 MG/1
20 TABLET ORAL DAILY
Qty: 90 TAB | Refills: 1 | Status: SHIPPED | OUTPATIENT
Start: 2020-03-18

## 2020-05-20 ENCOUNTER — OFFICE VISIT (OUTPATIENT)
Dept: SURGERY | Age: 80
End: 2020-05-20

## 2020-05-20 VITALS
RESPIRATION RATE: 16 BRPM | DIASTOLIC BLOOD PRESSURE: 68 MMHG | BODY MASS INDEX: 25 KG/M2 | WEIGHT: 124 LBS | HEIGHT: 59 IN | SYSTOLIC BLOOD PRESSURE: 124 MMHG | TEMPERATURE: 97.2 F

## 2020-05-20 DIAGNOSIS — C50.412 MALIGNANT NEOPLASM OF UPPER-OUTER QUADRANT OF LEFT BREAST IN FEMALE, ESTROGEN RECEPTOR POSITIVE (HCC): Primary | ICD-10-CM

## 2020-05-20 DIAGNOSIS — Z17.0 MALIGNANT NEOPLASM OF UPPER-OUTER QUADRANT OF LEFT BREAST IN FEMALE, ESTROGEN RECEPTOR POSITIVE (HCC): Primary | ICD-10-CM

## 2020-05-20 NOTE — PROGRESS NOTES
Ms. Mathew Del Real is a 70year old woman with left T2N0 ER/DC positive, HER negative invasive breast cancer, s/p left mastectomy by Dr. Geneva John 2/21/17. Oncotype was 9 with 6% distant recurrence with tamoxifen alone. She has been taking tamoxifen per Dr. Geneva John. She reports minor constipation but is otherwise tolerating it well. Breast/GYN history:    OB History    No obstetric history on file. Past Medical History:   Diagnosis Date    Breast CA ([address] 2017    left     High cholesterol     Hypertension     Sleep apnea     cpap    Thyroid disease     TIA (transient ischemic attack)     approx 8 years ago       Past Surgical History:   Procedure Laterality Date    COLONOSCOPY N/A 6/5/2018    COLONOSCOPY performed by Mitch Boeck, MD at [facility] ENDOSCOPY    HX HEENT Left     retina surgery    HX HYSTERECTOMY      HX MASTECTOMY Left     DC MASTECTOMY, SIMPLE, COMPLETE Left 02/21/2017    Dr. Geneva John       Current Outpatient Medications on File Prior to Visit   Medication Sig Dispense Refill    tamoxifen (NOLVADEX) 20 mg tablet Take 1 Tab by mouth daily. 90 Tab 1    amLODIPine (NORVASC) 5 mg tablet Take 1 Tab by mouth daily. 30 Tab 0    fish oil-omega-3 fatty acids 340-1,000 mg capsule Take 1 Cap by mouth two (2) times a week. Indications: takes 1200 2 times weekly      calcium-cholecalciferol, d3, (CALCIUM 600 + D) 600-125 mg-unit tab Take 1,200 mg by mouth.  aspirin delayed-release 81 mg tablet Take 81 mg by mouth daily.  ALPRAZolam (XANAX) 0.5 mg tablet TK 1 T PO ONCE A DAY FOR 30 DAYS  0    atorvastatin (LIPITOR) 40 mg tablet TK 1 T PO  ONCE A DAY  2    clopidogrel (PLAVIX) 75 mg tab TK 1 T PO QD  2    levothyroxine (SYNTHROID) 25 mcg tablet TK 1 T PO QD IN THE MORNING OES  2    multivitamin (ONE A DAY) tablet Take 1 Tab by mouth daily.  oxyCODONE-acetaminophen (PERCOCET) 5-325 mg per tablet Take 1 Tab by mouth every six (6) hours as needed for Pain.  Max Daily Amount: 4 Tabs. 30 Tab 0     No current facility-administered medications on file prior to visit. No Known Allergies    Social History     Tobacco Use    Smoking status: Former Smoker    Smokeless tobacco: Never Used   Substance Use Topics    Alcohol use: Yes     Comment: occasionally    Drug use: No       Family History   Problem Relation Age of Onset    Cancer Mother 39        Throat         ROS: positives are bolded  General: fevers, chills, night sweats, fatigue, weight loss, weight gain  GI: nausea, vomiting, abdominal pain, change in bowel habits, hematochezia, melena, GERD  Integ: dermatitis, abnormal moles  HEENT: visual changes, vertigo, epistaxis, dysphagia, hoarseness  Cardiac: chest pain, palpitations, HTN, edema, varicosities  Resp: cough, shortness of breath, wheezing, hemoptysis, snoring, reactive airway disease  : hematuria, dysuria, frequency, urgency, nocturia, stress urinary incontinence   MSK: weakness, joint pain, arthritis  Endocrine:  thyroid disease, polyuria, polydipsia, polyphagia, poor wound healing, heat intolerance, cold intolerance  Lymph/Heme: anemia, bruising, history of blood transfusions  Neuro: dizziness, headache, fainting, seizures, stroke  Psych: anxiety, depression    Physical Exam:  Visit Vitals  /68 (BP 1 Location: Right arm, BP Patient Position: Sitting)   Temp 97.2 °F (36.2 °C) (Oral)   Resp 16   Ht 4' 11\" (1.499 m)   Wt 56.2 kg (124 lb)   BMI 25.04 kg/m²       Gen:  No distress  Head: normocephalic, atraumatic  Mouth: Clear, no overt lesions, oral mucosa pink and moist  Neck: supple, no masses, no adenopathy, trachea midline  Resp: clear bilaterally  Cardio: Regular rate and rhythm  Abdomen: soft, nontender, nondistended  Extremeties: warm, well-perfused  Neuro: sensation and strength grossly intact and symmetrical  Psych: alert and oriented to person, place and time  Breasts:   Right: Examined in both the supine and upright positions.   There was no supraclavicular, infraclavicular, or axillary lympadenopathy. There were no dominant masses, no skin changes, no asymmetry identified   Left: Examined in both the supine and upright positions. There was no supraclavicular, infraclavicular, or axillary lympadenopathy. There were no dominant masses, no skin changes, no asymmetry identified s/p mastectomy without reconstruction, incision well healed, no evidence of recurrence       Imaging:  10/30/19 right mammo ([facility])  No suspicious finding for malignancy. Recommend clinical followup and yearly screening mammograms as per national  guidelines      BIRADS 2: Benign finding(s)     There are scattered areas of fibroglandular density.       Pathology:  2/21/17 [place]  A. LEFT BREAST, SIMPLE MASTECTOMY:   INVASIVE DUCTAL ADENOCARCINOMA ADJACENT TO PRIOR BIOPSY SITE. SIZE OF THE INVASIVE CARCINOMA: 2.0 CM. HISTOLOGIC TYPE: INVASIVE DUCTAL ADENOCARCINOMA. HISTOLOGIC GRADE MANUEL: 2.   TUBULAR DIFFERENTIATION SCORE: 3   NUCLEAR PLEOMORPHISM SCORE: 2   MITOTIC RATE SCORE: 1   TOTAL SCORE: 6   FOCALITY: SINGLE FOCUS. IN-SITU CARCINOMA COMPONENT: FOCAL DUCTAL CARCINOMA IN-SITU, COMEDO,   SOLID, AND PAPILLARY TYPE, COMPRISING LESS THAN 10% OF TUMOR MASS. MARGINS OF RESECTION: NO TUMOR IDENTIFIED. INVASIVE CARCINOMA: NEGATIVE FOR TUMOR (0.6CM FROM DEEP MARGIN). IN-SITU CARCINOMA: NEGATIVE FOR TUMOR (0.6CM FROM DEEP MARGIN). SKIN: NO TUMOR IDENTIFIED. NIPPLE: NO TUMOR IDENTIFIED. SKELETAL MUSCLE: NOT APPLICABLE. LYMPH NODES: ONE INTRAMAMMARY NODE NEGATIVE FOR TUMOR. THREE SENTINEL NODES (FROM SPECIMEN B) NEGATIVE FOR TUMOR. TOTAL NODES: 0/4, i-.   ESTROGEN RECEPTOR (FROM [id number]): POSITIVE (100%)   PROGESTERONE RECEPTOR (FROM [id number]): POSITIVE (60%). HER2/TAYLOR FISH (FROM [id number]): NEGATIVE. AJCC STAGE: T2, pN0 (i-).    B. LEFT AXILLA SENTINEL LYMPH NODES: 3 LYMPH NODES WITH NO TUMOR IDENTIFIED (0/3, i-).     1/31/17   LEFT BREAST, NEEDLE BIOPSIES:   ADENOCARCINOMA OF BREAST WITH MIXED DUCTAL AND LOBULAR FEATURES,   MODERATELY DIFFERENTIATED, NUCLEAR GRADE 2. Impression:  Patient Active Problem List   Diagnosis Code    Breast cancer of upper-outer quadrant of left female breast ([facility] [address] C50.412    Personal history of breast cancer Z85.3    [facility]ST [facility] I21.4    Elevated troponin R79.89    Dizziness R43     70year old woman with left T2N0 ER/AK positive, HER negative invasive breast cancer, s/p left mastectomy by Dr. Al Pham 2/21/17. Continue tamoxifen for now, I have recommended she meet with a medical oncologist to discuss optimal medical management. She is due for right mammogram October 2020. Assuming no abnormalities, follow up 1 year. All questions were answered. She was asked to call with any additional questions or concerns.

## 2020-05-20 NOTE — LETTER
5/20/2020 10:34 AM 
 
Patient:  Cade Lopez YOB: 1940 Date of Visit: 5/20/2020 Jazzmine Alexander MD 
[address] [address] VIA Facsimile: [phone] Dear Jazzmine Alexander MD, 
 
 
I had the pleasure of seeing Ms. Cade Lopez in my office today for her breast cancer in transition from Dr. Will Browne. I am including a copy of my office visit today. If you have questions, please do not hesitate to call me. I look forward to following Ms. Nguyen along with you and will keep you updated as to her progress. Sincerely, Symone Wolfe MD

## 2020-11-02 ENCOUNTER — HOSPITAL ENCOUNTER (OUTPATIENT)
Dept: MAMMOGRAPHY | Age: 80
Discharge: HOME | End: 2020-11-02
Attending: INTERNAL MEDICINE
Payer: MEDICARE

## 2020-11-02 DIAGNOSIS — Z17.0 ESTROGEN RECEPTOR POSITIVE: ICD-10-CM

## 2020-11-02 DIAGNOSIS — C50.412 MALIGNANT NEOPLASM OF UPPER-OUTER QUADRANT OF LEFT FEMALE BREAST (HCC): ICD-10-CM

## 2020-11-02 PROCEDURE — 77061 BREAST TOMOSYNTHESIS UNI: CPT

## 2021-01-11 ENCOUNTER — HOSPITAL ENCOUNTER (OUTPATIENT)
Dept: LAB | Age: 81
Discharge: HOME | End: 2021-01-11
Payer: MEDICARE

## 2021-01-11 LAB
ALBUMIN SERPL-MCNC: 4.1 G/DL (ref 3.4–5)
ALBUMIN/GLOB SERPL: 1.4 {RATIO} (ref 0.8–1.7)
ALP SERPL-CCNC: 45 U/L (ref 45–117)
ALT SERPL-CCNC: 22 U/L (ref 13–56)
ANION GAP SERPL CALC-SCNC: 6 MMOL/L (ref 3–18)
AST SERPL-CCNC: 16 U/L (ref 10–38)
BASOPHILS # BLD: 0 K/UL (ref 0–0.1)
BASOPHILS NFR BLD: 1 % (ref 0–2)
BILIRUB SERPL-MCNC: 0.4 MG/DL (ref 0.2–1)
BUN SERPL-MCNC: 19 MG/DL (ref 7–18)
BUN/CREAT SERPL: 19 (ref 12–20)
CALCIUM SERPL-MCNC: 9.9 MG/DL (ref 8.5–10.1)
CHLORIDE SERPL-SCNC: 106 MMOL/L (ref 100–111)
CHOLEST SERPL-MCNC: 240 MG/DL
CO2 SERPL-SCNC: 30 MMOL/L (ref 21–32)
CREAT SERPL-MCNC: 0.98 MG/DL (ref 0.6–1.3)
DIFFERENTIAL METHOD BLD: ABNORMAL
EOSINOPHIL # BLD: 0.1 K/UL (ref 0–0.4)
EOSINOPHIL NFR BLD: 2 % (ref 0–5)
ERYTHROCYTE [DISTWIDTH] IN BLOOD BY AUTOMATED COUNT: 12.4 % (ref 11.6–14.5)
GLOBULIN SER CALC-MCNC: 3 G/DL (ref 2–4)
GLUCOSE SERPL-MCNC: 86 MG/DL (ref 74–99)
HCT VFR BLD AUTO: 42.4 % (ref 35–45)
HDLC SERPL-MCNC: 124 MG/DL (ref 40–60)
HDLC SERPL: 1.9 {RATIO} (ref 0–5)
HGB BLD-MCNC: 14.3 G/DL (ref 12–16)
LDLC SERPL CALC-MCNC: 98.2 MG/DL (ref 0–100)
LIPID PROFILE,FLP: ABNORMAL
LYMPHOCYTES # BLD: 1.5 K/UL (ref 0.9–3.6)
LYMPHOCYTES NFR BLD: 25 % (ref 21–52)
MCH RBC QN AUTO: 32.4 PG (ref 24–34)
MCHC RBC AUTO-ENTMCNC: 33.7 G/DL (ref 31–37)
MCV RBC AUTO: 95.9 FL (ref 74–97)
MONOCYTES # BLD: 0.6 K/UL (ref 0.05–1.2)
MONOCYTES NFR BLD: 11 % (ref 3–10)
NEUTS SEG # BLD: 3.6 K/UL (ref 1.8–8)
NEUTS SEG NFR BLD: 61 % (ref 40–73)
PLATELET # BLD AUTO: 279 K/UL (ref 135–420)
PMV BLD AUTO: 9.7 FL (ref 9.2–11.8)
POTASSIUM SERPL-SCNC: 4.1 MMOL/L (ref 3.5–5.5)
PROT SERPL-MCNC: 7.1 G/DL (ref 6.4–8.2)
RBC # BLD AUTO: 4.42 M/UL (ref 4.2–5.3)
SODIUM SERPL-SCNC: 142 MMOL/L (ref 136–145)
T4 FREE SERPL-MCNC: 1.1 NG/DL (ref 0.7–1.5)
TRIGL SERPL-MCNC: 89 MG/DL (ref ?–150)
TSH SERPL DL<=0.05 MIU/L-ACNC: 2.37 UIU/ML (ref 0.36–3.74)
VLDLC SERPL CALC-MCNC: 17.8 MG/DL
WBC # BLD AUTO: 5.8 K/UL (ref 4.6–13.2)

## 2021-01-11 PROCEDURE — 80053 COMPREHEN METABOLIC PANEL: CPT

## 2021-01-11 PROCEDURE — 85025 COMPLETE CBC W/AUTO DIFF WBC: CPT

## 2021-01-11 PROCEDURE — 80061 LIPID PANEL: CPT

## 2021-01-11 PROCEDURE — 84439 ASSAY OF FREE THYROXINE: CPT

## 2021-01-11 PROCEDURE — 36415 COLL VENOUS BLD VENIPUNCTURE: CPT

## 2021-06-08 ENCOUNTER — TRANSCRIBE ORDER (OUTPATIENT)
Dept: SCHEDULING | Age: 81
End: 2021-06-08

## 2021-06-08 DIAGNOSIS — Z17.0 MALIGNANT NEOPLASM OF UPPER-OUTER QUADRANT OF LEFT BREAST IN FEMALE, ESTROGEN RECEPTOR POSITIVE (HCC): Primary | ICD-10-CM

## 2021-06-08 DIAGNOSIS — C50.412 MALIGNANT NEOPLASM OF UPPER-OUTER QUADRANT OF LEFT BREAST IN FEMALE, ESTROGEN RECEPTOR POSITIVE (HCC): Primary | ICD-10-CM

## 2021-06-10 ENCOUNTER — TRANSCRIBE ORDER (OUTPATIENT)
Dept: SCHEDULING | Age: 81
End: 2021-06-10

## 2021-06-10 DIAGNOSIS — M81.0 AGE-RELATED OSTEOPOROSIS WITHOUT CURRENT PATHOLOGICAL FRACTURE: Primary | ICD-10-CM

## 2021-11-03 ENCOUNTER — HOSPITAL ENCOUNTER (OUTPATIENT)
Dept: MAMMOGRAPHY | Age: 81
Discharge: HOME | End: 2021-11-03
Attending: NURSE PRACTITIONER
Payer: MEDICARE

## 2021-11-03 DIAGNOSIS — C50.412 MALIGNANT NEOPLASM OF UPPER-OUTER QUADRANT OF LEFT BREAST IN FEMALE, ESTROGEN RECEPTOR POSITIVE (HCC): ICD-10-CM

## 2021-11-03 DIAGNOSIS — Z17.0 MALIGNANT NEOPLASM OF UPPER-OUTER QUADRANT OF LEFT BREAST IN FEMALE, ESTROGEN RECEPTOR POSITIVE (HCC): ICD-10-CM

## 2021-11-03 PROCEDURE — 77061 BREAST TOMOSYNTHESIS UNI: CPT

## 2022-03-18 PROBLEM — C50.412 BREAST CANCER OF UPPER-OUTER QUADRANT OF LEFT FEMALE BREAST (HCC): Status: ACTIVE | Noted: 2017-02-06

## 2022-03-18 PROBLEM — R42 DIZZINESS: Status: ACTIVE | Noted: 2018-11-09

## 2022-03-19 PROBLEM — Z85.3 PERSONAL HISTORY OF BREAST CANCER: Status: ACTIVE | Noted: 2017-09-29

## 2022-03-19 PROBLEM — I21.4 NSTEMI (NON-ST ELEVATED MYOCARDIAL INFARCTION) (HCC): Status: ACTIVE | Noted: 2018-11-08

## 2022-03-19 PROBLEM — R77.8 ELEVATED TROPONIN: Status: ACTIVE | Noted: 2018-11-08

## 2022-03-19 PROBLEM — R79.89 ELEVATED TROPONIN: Status: ACTIVE | Noted: 2018-11-08

## 2022-11-09 ENCOUNTER — HOSPITAL ENCOUNTER (OUTPATIENT)
Dept: ULTRASOUND IMAGING | Age: 82
Discharge: HOME | End: 2022-11-09
Attending: INTERNAL MEDICINE

## 2022-11-09 ENCOUNTER — HOSPITAL ENCOUNTER (OUTPATIENT)
Dept: MAMMOGRAPHY | Age: 82
Discharge: HOME | End: 2022-11-09
Attending: INTERNAL MEDICINE
Payer: MEDICARE

## 2022-11-09 DIAGNOSIS — C50.412 MALIGNANT NEOPLASM OF UPPER-OUTER QUADRANT OF LEFT FEMALE BREAST (HCC): ICD-10-CM

## 2022-11-09 DIAGNOSIS — Z91.89 AT HIGH RISK FOR BREAST CANCER: ICD-10-CM

## 2022-11-09 DIAGNOSIS — Z90.12 H/O MASTECTOMY, LEFT: ICD-10-CM

## 2022-11-09 DIAGNOSIS — Z85.3 HX: BREAST CANCER: ICD-10-CM

## 2022-11-09 DIAGNOSIS — Z17.0 ESTROGEN RECEPTOR POSITIVE: ICD-10-CM

## 2022-11-09 PROCEDURE — 77061 BREAST TOMOSYNTHESIS UNI: CPT

## 2022-12-08 ENCOUNTER — TRANSCRIBE ORDER (OUTPATIENT)
Dept: SCHEDULING | Age: 82
End: 2022-12-08

## 2022-12-08 DIAGNOSIS — Z12.31 VISIT FOR SCREENING MAMMOGRAM: Primary | ICD-10-CM

## 2023-01-28 ENCOUNTER — TRANSCRIBE ORDERS (OUTPATIENT)
Facility: HOSPITAL | Age: 83
End: 2023-01-28

## 2023-01-28 DIAGNOSIS — Z12.31 VISIT FOR SCREENING MAMMOGRAM: Primary | ICD-10-CM

## 2023-01-31 DIAGNOSIS — Z12.31 VISIT FOR SCREENING MAMMOGRAM: Primary | ICD-10-CM

## 2023-02-05 DIAGNOSIS — Z12.31 VISIT FOR SCREENING MAMMOGRAM: Primary | ICD-10-CM

## 2023-07-10 NOTE — TELEPHONE ENCOUNTER
Called and assisted patient with canceling 1 week post op appointment. Due to provider being out. Pt will speak with Dietarnold crocker at 2 week post op visit.    Received call from patient who has been previously under the care of Dr. Darlin Da Silva. She was in the process of transferring her care to Dr. Edelmira Francis when Coronavirus concerns have caused her to delay her visit with Dr. Edelmira Francis. Patient inquiring about tamoxifen script as Dr. Darlin Da Silva has previously ordered it. Per Dr. Edelmira Francis ok to refill for 6 months. Script sent to pharmacy on file. I have called patient to inform her. Left VM asking for patient to return the call with any further concerns.

## 2023-11-08 ENCOUNTER — HOSPITAL ENCOUNTER (OUTPATIENT)
Facility: HOSPITAL | Age: 83
Setting detail: SPECIMEN
Discharge: HOME | End: 2023-11-11
Payer: MEDICARE

## 2023-11-08 LAB
APPEARANCE FLD: ABNORMAL
BODY FLD TYPE: NORMAL
COLOR FLD: YELLOW
CRYSTALS FLD MICRO: NORMAL
EOSINOPHIL NFR FLD MANUAL: 0 %
LYMPHOCYTES NFR FLD: 17 %
MONOCYTES NFR FLD: 6 %
NEUTROPHILS NFR FLD: 77 % (ref 0–25)
NEUTS BAND # FLD: 0 %
NUC CELL # FLD: ABNORMAL /CU MM
RBC # FLD: 8000 /CU MM
SPECIMEN SOURCE FLD: ABNORMAL

## 2023-11-08 PROCEDURE — 87070 CULTURE OTHR SPECIMN AEROBIC: CPT

## 2023-11-08 PROCEDURE — 89051 BODY FLUID CELL COUNT: CPT

## 2023-11-08 PROCEDURE — 87075 CULTR BACTERIA EXCEPT BLOOD: CPT

## 2023-11-08 PROCEDURE — 87205 SMEAR GRAM STAIN: CPT

## 2023-11-08 PROCEDURE — 89060 EXAM SYNOVIAL FLUID CRYSTALS: CPT

## 2023-11-12 LAB
BACTERIA SPEC CULT: NORMAL
BACTERIA SPEC CULT: NORMAL
GRAM STN SPEC: NORMAL
GRAM STN SPEC: NORMAL
SERVICE CMNT-IMP: NORMAL
SERVICE CMNT-IMP: NORMAL

## 2024-02-06 ENCOUNTER — HOSPITAL ENCOUNTER (OUTPATIENT)
Facility: HOSPITAL | Age: 84
Discharge: HOME | End: 2024-02-09
Payer: MEDICARE

## 2024-02-06 DIAGNOSIS — R91.1 SOLITARY PULMONARY NODULE: ICD-10-CM

## 2024-02-06 DIAGNOSIS — J45.991 COUGH VARIANT ASTHMA: ICD-10-CM

## 2024-02-06 LAB — CREAT UR-MCNC: 0.7 MG/DL (ref 0.6–1.3)

## 2024-02-06 PROCEDURE — 71260 CT THORAX DX C+: CPT

## 2024-02-06 PROCEDURE — 6360000004 HC RX CONTRAST MEDICATION: Performed by: INTERNAL MEDICINE

## 2024-02-06 PROCEDURE — 82565 ASSAY OF CREATININE: CPT

## 2024-02-06 RX ADMIN — IOPAMIDOL 80 ML: 612 INJECTION, SOLUTION INTRAVENOUS at 10:31

## 2024-02-08 ENCOUNTER — TRANSCRIBE ORDERS (OUTPATIENT)
Facility: HOSPITAL | Age: 84
End: 2024-02-08

## 2024-02-08 DIAGNOSIS — M89.9 BONE DISEASE: Primary | ICD-10-CM

## 2024-02-13 ENCOUNTER — HOSPITAL ENCOUNTER (OUTPATIENT)
Facility: HOSPITAL | Age: 84
Discharge: HOME | End: 2024-02-16
Payer: MEDICARE

## 2024-02-13 DIAGNOSIS — M89.9 BONE DISEASE: ICD-10-CM

## 2024-02-13 PROCEDURE — 78306 BONE IMAGING WHOLE BODY: CPT | Performed by: FAMILY MEDICINE

## 2024-02-13 PROCEDURE — A9503 TC99M MEDRONATE: HCPCS | Performed by: FAMILY MEDICINE

## 2024-02-13 PROCEDURE — 3430000000 HC RX DIAGNOSTIC RADIOPHARMACEUTICAL: Performed by: FAMILY MEDICINE

## 2024-02-13 RX ORDER — TC 99M MEDRONATE 20 MG/10ML
27.5 INJECTION, POWDER, LYOPHILIZED, FOR SOLUTION INTRAVENOUS
Status: DISCONTINUED | OUTPATIENT
Start: 2024-02-13 | End: 2024-02-13

## 2024-02-13 RX ORDER — TC 99M MEDRONATE 20 MG/10ML
27.5 INJECTION, POWDER, LYOPHILIZED, FOR SOLUTION INTRAVENOUS
Status: COMPLETED | OUTPATIENT
Start: 2024-02-13 | End: 2024-02-13

## 2024-02-13 RX ADMIN — TC 99M MEDRONATE 27.5 MILLICURIE: 20 INJECTION, POWDER, LYOPHILIZED, FOR SOLUTION INTRAVENOUS at 10:26

## 2024-02-19 ENCOUNTER — OFFICE VISIT (OUTPATIENT)
Age: 84
End: 2024-02-19
Payer: MEDICARE

## 2024-02-19 VITALS
BODY MASS INDEX: 22.88 KG/M2 | WEIGHT: 109 LBS | HEIGHT: 58 IN | HEART RATE: 86 BPM | OXYGEN SATURATION: 100 % | SYSTOLIC BLOOD PRESSURE: 122 MMHG | DIASTOLIC BLOOD PRESSURE: 70 MMHG

## 2024-02-19 DIAGNOSIS — I10 HYPERTENSION, UNSPECIFIED TYPE: Primary | ICD-10-CM

## 2024-02-19 DIAGNOSIS — E78.5 DYSLIPIDEMIA: ICD-10-CM

## 2024-02-19 DIAGNOSIS — G45.9 TIA (TRANSIENT ISCHEMIC ATTACK): ICD-10-CM

## 2024-02-19 DIAGNOSIS — C50.412 MALIGNANT NEOPLASM OF UPPER-OUTER QUADRANT OF LEFT FEMALE BREAST, UNSPECIFIED ESTROGEN RECEPTOR STATUS (HCC): ICD-10-CM

## 2024-02-19 DIAGNOSIS — I25.2 PAST HEART ATTACK: ICD-10-CM

## 2024-02-19 PROCEDURE — 93000 ELECTROCARDIOGRAM COMPLETE: CPT | Performed by: INTERNAL MEDICINE

## 2024-02-19 PROCEDURE — G8420 CALC BMI NORM PARAMETERS: HCPCS | Performed by: INTERNAL MEDICINE

## 2024-02-19 PROCEDURE — 99204 OFFICE O/P NEW MOD 45 MIN: CPT | Performed by: INTERNAL MEDICINE

## 2024-02-19 PROCEDURE — 3074F SYST BP LT 130 MM HG: CPT | Performed by: INTERNAL MEDICINE

## 2024-02-19 PROCEDURE — 1036F TOBACCO NON-USER: CPT | Performed by: INTERNAL MEDICINE

## 2024-02-19 PROCEDURE — G8484 FLU IMMUNIZE NO ADMIN: HCPCS | Performed by: INTERNAL MEDICINE

## 2024-02-19 PROCEDURE — G8400 PT W/DXA NO RESULTS DOC: HCPCS | Performed by: INTERNAL MEDICINE

## 2024-02-19 PROCEDURE — G8427 DOCREV CUR MEDS BY ELIG CLIN: HCPCS | Performed by: INTERNAL MEDICINE

## 2024-02-19 PROCEDURE — 3078F DIAST BP <80 MM HG: CPT | Performed by: INTERNAL MEDICINE

## 2024-02-19 PROCEDURE — 1123F ACP DISCUSS/DSCN MKR DOCD: CPT | Performed by: INTERNAL MEDICINE

## 2024-02-19 PROCEDURE — 1090F PRES/ABSN URINE INCON ASSESS: CPT | Performed by: INTERNAL MEDICINE

## 2024-02-19 ASSESSMENT — ANXIETY QUESTIONNAIRES
3. WORRYING TOO MUCH ABOUT DIFFERENT THINGS: 0
4. TROUBLE RELAXING: 0
1. FEELING NERVOUS, ANXIOUS, OR ON EDGE: 0
5. BEING SO RESTLESS THAT IT IS HARD TO SIT STILL: 0
6. BECOMING EASILY ANNOYED OR IRRITABLE: 0
GAD7 TOTAL SCORE: 0
2. NOT BEING ABLE TO STOP OR CONTROL WORRYING: 0
7. FEELING AFRAID AS IF SOMETHING AWFUL MIGHT HAPPEN: 0

## 2024-02-19 ASSESSMENT — PATIENT HEALTH QUESTIONNAIRE - PHQ9
SUM OF ALL RESPONSES TO PHQ QUESTIONS 1-9: 0
2. FEELING DOWN, DEPRESSED OR HOPELESS: 0
SUM OF ALL RESPONSES TO PHQ9 QUESTIONS 1 & 2: 0
1. LITTLE INTEREST OR PLEASURE IN DOING THINGS: 0
SUM OF ALL RESPONSES TO PHQ QUESTIONS 1-9: 0

## 2024-02-19 ASSESSMENT — ENCOUNTER SYMPTOMS
ABDOMINAL PAIN: 0
ABDOMINAL DISTENTION: 0
COUGH: 0
NAUSEA: 0
VOMITING: 0
SORE THROAT: 0
SHORTNESS OF BREATH: 0

## 2024-02-19 NOTE — PROGRESS NOTES
02/19/24     Jamar Garcia  is a 83 y.o. female     Chief Complaint   Patient presents with    New Patient     Self referred for history of heart attack       HPI    Patient presents for a new office visit.  She was self-referred to establish care with a cardiologist.  She has a past medical history significant for a remote TIA back in 2009 while living in [place].  She is been maintained on dual antiplatelet therapy since that time.  More recently, she was hospitalized at [facility] in 2018 with a presumed type II MI due to hypertensive urgency.  She underwent an echocardiogram in November 2018 which showed preserved LVEF 55 to 6% without any regional wall motion abnormalities, mild concentric LVH, no significant valvular heart disease.  She also underwent a pharmacologic nuclear stress test which showed no evidence of ischemia or infarction.  Preserved EF.    She states over the past 5 years she has been feeling well.  She denies any new chest pain shortness of breath or leg swelling.  She tries to walk at least 30 minutes 3 times a week.  She also does stretching and strengthening exercises.    Past Medical History:   Diagnosis Date    Breast CA (HCC) 2017    left     High cholesterol     Hypertension     Sleep apnea     cpap    Thyroid disease     TIA (transient ischemic attack)     approx 8 years ago     Current Outpatient Medications   Medication Sig Dispense Refill    ALPRAZolam (XANAX) 0.5 MG tablet TK 1 T PO ONCE A DAY FOR 30 DAYS      amLODIPine (NORVASC) 5 MG tablet Take 1 tablet by mouth daily      aspirin 81 MG EC tablet Take 1 tablet by mouth daily      atorvastatin (LIPITOR) 40 MG tablet TK 1 T PO  ONCE A DAY      Calcium Carbonate-Vitamin D 600-3.125 MG-MCG TABS Take 1,200 mg by mouth      clopidogrel (PLAVIX) 75 MG tablet TK 1 T PO QD      levothyroxine (SYNTHROID) 25 MCG tablet TK 1 T PO QD IN THE MORNING OES       No current facility-administered medications for this visit.     No Known

## 2024-02-19 NOTE — PROGRESS NOTES
Jamar Garcia presents today for   Chief Complaint   Patient presents with    New Patient     Self referred for history of heart attack       Jamar Garcia preferred language for health care discussion is english/other.    Is someone accompanying this pt? no    Is the patient using any DME equipment during OV? no    Depression Screening:  Depression: Not at risk (2/19/2024)    PHQ-2     PHQ-2 Score: 0        Learning Assessment:  Who is the primary learner? Patient    What is the preferred language for health care of the primary learner? ENGLISH    How does the primary learner prefer to learn new concepts? DEMONSTRATION    Answered By patient    Relationship to Learner SELF           Pt currently taking Anticoagulant therapy? no    Pt currently taking Antiplatelet therapy ? Aspirin 81 mg daily      Coordination of Care:  1. Have you been to the ER, urgent care clinic since your last visit? Hospitalized since your last visit? no    2. Have you seen or consulted any other health care providers outside of the [facility] since your last visit? Include any pap smears or colon screening. no

## 2024-02-28 ENCOUNTER — HOSPITAL ENCOUNTER (OUTPATIENT)
Facility: HOSPITAL | Age: 84
Discharge: HOME | End: 2024-03-02
Payer: MEDICARE

## 2024-02-28 DIAGNOSIS — M89.9 BONE LESION: ICD-10-CM

## 2024-02-28 LAB — CREAT UR-MCNC: 0.6 MG/DL (ref 0.6–1.3)

## 2024-02-28 PROCEDURE — 82565 ASSAY OF CREATININE: CPT

## 2024-02-28 PROCEDURE — A9577 INJ MULTIHANCE: HCPCS | Performed by: FAMILY MEDICINE

## 2024-02-28 PROCEDURE — 6360000004 HC RX CONTRAST MEDICATION: Performed by: FAMILY MEDICINE

## 2024-02-28 PROCEDURE — 71552 MRI CHEST W/O & W/DYE: CPT

## 2024-02-28 RX ADMIN — GADOBENATE DIMEGLUMINE 10 ML: 529 INJECTION, SOLUTION INTRAVENOUS at 16:02

## 2024-03-07 ENCOUNTER — HOSPITAL ENCOUNTER (OUTPATIENT)
Facility: HOSPITAL | Age: 84
Discharge: HOME | End: 2024-03-07
Payer: MEDICARE

## 2024-03-07 PROCEDURE — 94729 DIFFUSING CAPACITY: CPT

## 2024-03-07 PROCEDURE — 94726 PLETHYSMOGRAPHY LUNG VOLUMES: CPT

## 2024-03-07 PROCEDURE — 94060 EVALUATION OF WHEEZING: CPT

## 2024-03-08 LAB
DLCO: NORMAL
FEV1/FVC: NORMAL
FEV1: NORMAL
FVC: NORMAL
TLC: NORMAL

## 2024-03-26 ENCOUNTER — TELEPHONE (OUTPATIENT)
Age: 84
End: 2024-03-26

## 2024-03-26 NOTE — TELEPHONE ENCOUNTER
LVM TO JULIA APPT - REFERRAL FROM [facility] INTERNAL MED IN MEDIA

## 2024-04-15 ENCOUNTER — HOSPITAL ENCOUNTER (EMERGENCY)
Facility: HOSPITAL | Age: 84
Discharge: HOME | End: 2024-04-15
Payer: MEDICARE

## 2024-04-15 VITALS
TEMPERATURE: 97.6 F | HEART RATE: 86 BPM | WEIGHT: 107 LBS | DIASTOLIC BLOOD PRESSURE: 57 MMHG | OXYGEN SATURATION: 100 % | HEIGHT: 58 IN | BODY MASS INDEX: 22.46 KG/M2 | SYSTOLIC BLOOD PRESSURE: 128 MMHG | RESPIRATION RATE: 20 BRPM

## 2024-04-15 DIAGNOSIS — D64.9 ANEMIA, UNSPECIFIED TYPE: Primary | ICD-10-CM

## 2024-04-15 DIAGNOSIS — E83.119 HEMOCHROMATOSIS, UNSPECIFIED HEMOCHROMATOSIS TYPE: ICD-10-CM

## 2024-04-15 LAB
ALBUMIN SERPL-MCNC: 2.8 G/DL (ref 3.4–5)
ALBUMIN/GLOB SERPL: 0.7 (ref 0.8–1.7)
ALP SERPL-CCNC: 84 U/L (ref 45–117)
ALT SERPL-CCNC: 18 U/L (ref 13–56)
ANION GAP SERPL CALC-SCNC: 5 MMOL/L (ref 3–18)
AST SERPL-CCNC: 13 U/L (ref 10–38)
BASOPHILS # BLD: 0 K/UL (ref 0–0.1)
BASOPHILS NFR BLD: 0 % (ref 0–2)
BILIRUB SERPL-MCNC: 0.5 MG/DL (ref 0.2–1)
BUN SERPL-MCNC: 15 MG/DL (ref 7–18)
BUN/CREAT SERPL: 26 (ref 12–20)
CALCIUM SERPL-MCNC: 9.1 MG/DL (ref 8.5–10.1)
CHLORIDE SERPL-SCNC: 99 MMOL/L (ref 100–111)
CO2 SERPL-SCNC: 27 MMOL/L (ref 21–32)
CREAT SERPL-MCNC: 0.57 MG/DL (ref 0.6–1.3)
DIFFERENTIAL METHOD BLD: ABNORMAL
EKG ATRIAL RATE: 93 BPM
EKG DIAGNOSIS: NORMAL
EKG P AXIS: 49 DEGREES
EKG P-R INTERVAL: 152 MS
EKG Q-T INTERVAL: 366 MS
EKG QRS DURATION: 76 MS
EKG QTC CALCULATION (BAZETT): 455 MS
EKG R AXIS: 54 DEGREES
EKG T AXIS: 16 DEGREES
EKG VENTRICULAR RATE: 93 BPM
EOSINOPHIL # BLD: 0.1 K/UL (ref 0–0.4)
EOSINOPHIL NFR BLD: 1 % (ref 0–5)
ERYTHROCYTE [DISTWIDTH] IN BLOOD BY AUTOMATED COUNT: 17.4 % (ref 11.6–14.5)
GLOBULIN SER CALC-MCNC: 4.1 G/DL (ref 2–4)
GLUCOSE SERPL-MCNC: 128 MG/DL (ref 74–99)
HCT VFR BLD AUTO: 22.9 % (ref 35–45)
HEMOCCULT STL QL: NEGATIVE
HGB BLD-MCNC: 6.8 G/DL (ref 12–16)
HISTORY CHECK: NORMAL
IMM GRANULOCYTES # BLD AUTO: 0 K/UL (ref 0–0.04)
IMM GRANULOCYTES NFR BLD AUTO: 0 % (ref 0–0.5)
INR PPP: 1.1 (ref 0.9–1.1)
LYMPHOCYTES # BLD: 0.8 K/UL (ref 0.9–3.6)
LYMPHOCYTES NFR BLD: 8 % (ref 21–52)
MCH RBC QN AUTO: 22.5 PG (ref 24–34)
MCHC RBC AUTO-ENTMCNC: 29.7 G/DL (ref 31–37)
MCV RBC AUTO: 75.8 FL (ref 78–100)
MONOCYTES # BLD: 0.9 K/UL (ref 0.05–1.2)
MONOCYTES NFR BLD: 9 % (ref 3–10)
NEUTS SEG # BLD: 8 K/UL (ref 1.8–8)
NEUTS SEG NFR BLD: 82 % (ref 40–73)
NRBC # BLD: 0 K/UL (ref 0–0.01)
NRBC BLD-RTO: 0 PER 100 WBC
PLATELET # BLD AUTO: 407 K/UL (ref 135–420)
PMV BLD AUTO: 8.1 FL (ref 9.2–11.8)
POTASSIUM SERPL-SCNC: 3.7 MMOL/L (ref 3.5–5.5)
PROT SERPL-MCNC: 6.9 G/DL (ref 6.4–8.2)
PROTHROMBIN TIME: 14.7 SEC (ref 11.9–14.7)
RBC # BLD AUTO: 3.02 M/UL (ref 4.2–5.3)
SODIUM SERPL-SCNC: 131 MMOL/L (ref 136–145)
WBC # BLD AUTO: 9.8 K/UL (ref 4.6–13.2)

## 2024-04-15 PROCEDURE — 36430 TRANSFUSION BLD/BLD COMPNT: CPT

## 2024-04-15 PROCEDURE — 85025 COMPLETE CBC W/AUTO DIFF WBC: CPT

## 2024-04-15 PROCEDURE — 99285 EMERGENCY DEPT VISIT HI MDM: CPT

## 2024-04-15 PROCEDURE — 93005 ELECTROCARDIOGRAM TRACING: CPT

## 2024-04-15 PROCEDURE — 82272 OCCULT BLD FECES 1-3 TESTS: CPT

## 2024-04-15 PROCEDURE — P9016 RBC LEUKOCYTES REDUCED: HCPCS

## 2024-04-15 PROCEDURE — 86850 RBC ANTIBODY SCREEN: CPT

## 2024-04-15 PROCEDURE — 80053 COMPREHEN METABOLIC PANEL: CPT

## 2024-04-15 PROCEDURE — 86900 BLOOD TYPING SEROLOGIC ABO: CPT

## 2024-04-15 PROCEDURE — 86901 BLOOD TYPING SEROLOGIC RH(D): CPT

## 2024-04-15 PROCEDURE — 94761 N-INVAS EAR/PLS OXIMETRY MLT: CPT

## 2024-04-15 PROCEDURE — 93010 ELECTROCARDIOGRAM REPORT: CPT | Performed by: INTERNAL MEDICINE

## 2024-04-15 PROCEDURE — 85610 PROTHROMBIN TIME: CPT

## 2024-04-15 PROCEDURE — 86923 COMPATIBILITY TEST ELECTRIC: CPT

## 2024-04-15 RX ORDER — SODIUM CHLORIDE 9 MG/ML
INJECTION, SOLUTION INTRAVENOUS PRN
Status: DISCONTINUED | OUTPATIENT
Start: 2024-04-15 | End: 2024-04-15 | Stop reason: HOSPADM

## 2024-04-15 ASSESSMENT — PAIN SCALES - GENERAL: PAINLEVEL_OUTOF10: 0

## 2024-04-15 ASSESSMENT — PAIN - FUNCTIONAL ASSESSMENT: PAIN_FUNCTIONAL_ASSESSMENT: NONE - DENIES PAIN

## 2024-04-15 NOTE — CONSENT
Informed Consent for Blood Component Transfusion Note    I have discussed with the patient the rationale for blood component transfusion; its benefits in treating or preventing fatigue, organ damage, or death; and its risk which includes mild transfusion reactions, rare risk of blood borne infection, or more serious but rare reactions. I have discussed the alternatives to transfusion, including the risk and consequences of not receiving transfusion. The patient had an opportunity to ask questions and had agreed to proceed with transfusion of blood components.    Electronically signed by Ivon Giles PA-C on 4/15/24 at 2:16 PM EDT

## 2024-04-15 NOTE — ED NOTES
Pt ambulated to bathroom with little assistance, placed back on monitor and in position of most comfort upon her return

## 2024-04-15 NOTE — ED NOTES
Pt ambulates to bathroom with little assistance, placed back on monitor upon her return, will continue to monitor

## 2024-04-15 NOTE — ED TRIAGE NOTES
Pt in ED with c/o low hemoglobin. Pt was told by here PCP to come to ER HGB 6. Pt also states she has been having dark stools. Pt is on plavix

## 2024-04-15 NOTE — ED NOTES
Received report from ELFEGO Dill and ELFEGO Huffman. Pt is currently resting on the ED bed in NAD. Bed is at the lowest position with brakes on. X2 rails are up. Call light is within reach.

## 2024-04-15 NOTE — ED PROVIDER NOTES
Duration 76 ms    Q-T Interval 366 ms    QTc Calculation (Bazett) 455 ms    P Axis 49 degrees    R Axis 54 degrees    T Axis 16 degrees    Diagnosis       Normal sinus rhythm  Normal ECG  When compared with ECG of 09-NOV-2018 08:35,  Nonspecific T wave abnormality no longer evident in Lateral leads  Confirmed by MD Dylan, Finn ([id number]) on 4/15/2024 3:40:38 PM     Occult Blood, Fecal    Collection Time: 04/15/24  1:40 PM   Result Value Ref Range    POC Occult Blood, Fecal Negative NEG     PREPARE RBC (CROSSMATCH), 1 Units    Collection Time: 04/15/24  2:30 PM   Result Value Ref Range    History Check Historical check performed        Radiologic Studies  No orders to display       EKG interpretation: (Preliminary)      Procedures     Procedures    ED Course     12:54 PM EDT I (Ivon Garrett PA-C) am the first provider for this patient. Initial assessment performed. I reviewed the vital signs, available nursing notes, past medical history, past surgical history, family history and social history. The patients presenting problems have been discussed, and they are in agreement with the care plan formulated and outlined with them.  I have encouraged them to ask questions as they arise throughout their visit.    Records Reviewed: Nursing Notes, Old Medical Records, and Clinical Records from a Different Specialty (Hem/onc)    Is this patient to be included in the SEP-1 core measure? No Exclusion criteria - the patient is NOT to be included for SEP-1 Core Measure due to: 2+ SIRS criteria are not met    MEDICATIONS ADMINISTERED IN THE ED:  Medications   0.9 % sodium chloride infusion (has no administration in time range)       ED Course as of 04/15/24 2007   Mon Apr 15, 2024   1353 Heme occult neg, will call [facility].  [CD]   1356 Spoke with triage nurse at [facility], they states that provider will call me back. [CD]   1413 Spoke with Anna from hematology oncology nurse practitioner, due to patient having anemia and

## 2024-04-16 LAB
ABO + RH BLD: NORMAL
BLD PROD TYP BPU: NORMAL
BLOOD BANK BLOOD PRODUCT EXPIRATION DATE: NORMAL
BLOOD BANK DISPENSE STATUS: NORMAL
BLOOD BANK ISBT PRODUCT BLOOD TYPE: 6200
BLOOD BANK PRODUCT CODE: NORMAL
BLOOD BANK UNIT TYPE AND RH: NORMAL
BLOOD GROUP ANTIBODIES SERPL: NORMAL
BPU ID: NORMAL
CALLED TO: NORMAL
CROSSMATCH RESULT: NORMAL
SPECIMEN EXP DATE BLD: NORMAL
UNIT DIVISION: 0
UNIT ISSUE DATE/TIME: NORMAL

## 2024-04-16 NOTE — DISCHARGE INSTRUCTIONS
Please follow-up with your hematologist/oncologist as scheduled next week, please return to the ED immediately if worsening or new development of symptoms, and also follow-up with your primary care physician

## 2024-04-16 NOTE — ED NOTES
Confirmed with DOC Del Valle that she spoke with the Pt's hematologist and planned for dc after the transfusion completed; no H&H post-transfusion was needed.

## 2024-05-08 NOTE — PERIOP NOTE
Instructions for your procedure at [facility]      Today's Date: 5/8/2024      Patient's Name: Jamar Garcia      Procedure Date: 05/14/2024        Please enter the main entrance of the hospital and check-in at the  located in the lobby.      Do NOT eat or drink anything, including candy, gum, or ice chips after midnight prior to your procedure, unless it is part of your prep.  Brush your teeth before coming to the hospital.You may swish with water, but do not swallow.  No smoking/Vaping/E-Cigarettes 24 hours prior to the day of procedure.  No alcohol 24 hours prior to the day of procedure.  No recreational drugs for one week prior to the day of procedure.  Bring Photo ID, Insurance information, and Co-pay if required on day of procedure.  Bring in pertinent legal documents, such as, Medical Power of , DNR, Advance Directive, etc.  Leave all other valuables, including money/purse, at home.  Remove jewelry, including ALL body piercings, nail polish, acrylic nails, and makeup (including mascara); no lotions, powders, deodorant, and/or perfume/cologne/after shave on the skin.  Glasses and dentures may be worn to the hospital.  They must be removed prior to procedure. Please bring case/container for glasses or dentures.  11. Contacts should not be worn on day of procedure.   12. Call the office ([phone]) if you have symptoms of a cold or illness within 24-48 hours prior to your procedure.   13. AN ADULT (relative or friend 18 years or older) MUST DRIVE YOU HOME AFTER YOUR PROCEDURE.   14. Please make arrangements for a responsible adult (18 years or older) to be with you for 24 hours after your procedure.   15. ONE VISITOR will be allowed in the waiting area during your procedure.       Special Instructions:      Bring list of CURRENT medications.  Follow instructions from the office regarding Bowel Prep, Vitamins, Iron, Blood Thinners, Insulin, Seizure, and Blood

## 2024-05-13 ENCOUNTER — ANESTHESIA EVENT (OUTPATIENT)
Facility: HOSPITAL | Age: 84
End: 2024-05-13
Payer: MEDICARE

## 2024-05-14 ENCOUNTER — ANESTHESIA (OUTPATIENT)
Facility: HOSPITAL | Age: 84
End: 2024-05-14
Payer: MEDICARE

## 2024-05-14 ENCOUNTER — HOSPITAL ENCOUNTER (OUTPATIENT)
Facility: HOSPITAL | Age: 84
Setting detail: OUTPATIENT SURGERY
Discharge: HOME | End: 2024-05-14
Attending: INTERNAL MEDICINE | Admitting: INTERNAL MEDICINE
Payer: MEDICARE

## 2024-05-14 VITALS
TEMPERATURE: 97.8 F | DIASTOLIC BLOOD PRESSURE: 64 MMHG | SYSTOLIC BLOOD PRESSURE: 117 MMHG | WEIGHT: 101.4 LBS | RESPIRATION RATE: 18 BRPM | HEIGHT: 59 IN | HEART RATE: 83 BPM | OXYGEN SATURATION: 99 % | BODY MASS INDEX: 20.44 KG/M2

## 2024-05-14 PROCEDURE — 3700000000 HC ANESTHESIA ATTENDED CARE: Performed by: INTERNAL MEDICINE

## 2024-05-14 PROCEDURE — 7100000010 HC PHASE II RECOVERY - FIRST 15 MIN: Performed by: INTERNAL MEDICINE

## 2024-05-14 PROCEDURE — 2500000003 HC RX 250 WO HCPCS: Performed by: NURSE ANESTHETIST, CERTIFIED REGISTERED

## 2024-05-14 PROCEDURE — 7100000000 HC PACU RECOVERY - FIRST 15 MIN: Performed by: INTERNAL MEDICINE

## 2024-05-14 PROCEDURE — 2709999900 HC NON-CHARGEABLE SUPPLY: Performed by: INTERNAL MEDICINE

## 2024-05-14 PROCEDURE — 2580000003 HC RX 258: Performed by: NURSE ANESTHETIST, CERTIFIED REGISTERED

## 2024-05-14 PROCEDURE — 3600007502: Performed by: INTERNAL MEDICINE

## 2024-05-14 PROCEDURE — 6360000002 HC RX W HCPCS: Performed by: NURSE ANESTHETIST, CERTIFIED REGISTERED

## 2024-05-14 RX ORDER — LIDOCAINE HYDROCHLORIDE 10 MG/ML
1 INJECTION, SOLUTION EPIDURAL; INFILTRATION; INTRACAUDAL; PERINEURAL
Status: DISCONTINUED | OUTPATIENT
Start: 2024-05-14 | End: 2024-05-14 | Stop reason: HOSPADM

## 2024-05-14 RX ORDER — PROPOFOL 10 MG/ML
INJECTION, EMULSION INTRAVENOUS PRN
Status: DISCONTINUED | OUTPATIENT
Start: 2024-05-14 | End: 2024-05-14 | Stop reason: SDUPTHER

## 2024-05-14 RX ORDER — LIDOCAINE HYDROCHLORIDE 20 MG/ML
INJECTION, SOLUTION EPIDURAL; INFILTRATION; INTRACAUDAL; PERINEURAL PRN
Status: DISCONTINUED | OUTPATIENT
Start: 2024-05-14 | End: 2024-05-14 | Stop reason: SDUPTHER

## 2024-05-14 RX ORDER — SODIUM CHLORIDE 0.9 % (FLUSH) 0.9 %
5-40 SYRINGE (ML) INJECTION PRN
Status: DISCONTINUED | OUTPATIENT
Start: 2024-05-14 | End: 2024-05-14 | Stop reason: HOSPADM

## 2024-05-14 RX ORDER — SODIUM CHLORIDE, SODIUM LACTATE, POTASSIUM CHLORIDE, CALCIUM CHLORIDE 600; 310; 30; 20 MG/100ML; MG/100ML; MG/100ML; MG/100ML
INJECTION, SOLUTION INTRAVENOUS CONTINUOUS
Status: DISCONTINUED | OUTPATIENT
Start: 2024-05-14 | End: 2024-05-14 | Stop reason: HOSPADM

## 2024-05-14 RX ORDER — FAMOTIDINE 20 MG/1
20 TABLET, FILM COATED ORAL ONCE
Status: DISCONTINUED | OUTPATIENT
Start: 2024-05-14 | End: 2024-05-14 | Stop reason: HOSPADM

## 2024-05-14 RX ADMIN — PROPOFOL 100 MG: 10 INJECTION, EMULSION INTRAVENOUS at 11:36

## 2024-05-14 RX ADMIN — LIDOCAINE HYDROCHLORIDE 50 MG: 20 INJECTION, SOLUTION EPIDURAL; INFILTRATION; INTRACAUDAL; PERINEURAL at 11:36

## 2024-05-14 RX ADMIN — SODIUM CHLORIDE, POTASSIUM CHLORIDE, SODIUM LACTATE AND CALCIUM CHLORIDE: 600; 310; 30; 20 INJECTION, SOLUTION INTRAVENOUS at 11:07

## 2024-05-14 ASSESSMENT — PAIN - FUNCTIONAL ASSESSMENT
PAIN_FUNCTIONAL_ASSESSMENT: 0-10
PAIN_FUNCTIONAL_ASSESSMENT: 0-10

## 2024-05-14 NOTE — ANESTHESIA POSTPROCEDURE EVALUATION
Department of Anesthesiology  Postprocedure Note    Patient: Jamar Garcia  MRN: [id number]  YOB: 1940  Date of evaluation: 5/14/2024    Procedure Summary       Date: 05/14/24 Room / Location: [facility] ENDO 02 / [facility] ENDOSCOPY    Anesthesia Start: 1129 Anesthesia Stop: 1145    Procedure: ESOPHAGOGASTRODUODENOSCOPY (Upper GI Region) Diagnosis: Anemia due to chronic blood loss    Surgeons: Honorio Wiseman MD Responsible Provider: Irma Rooney MD    Anesthesia Type: MAC ASA Status: 3            Anesthesia Type: MAC    Sharri Phase I: Sharri Score: 9    Sharri Phase II: Sharri Score: 10    Anesthesia Post Evaluation    Patient location during evaluation: PACU  Patient participation: complete - patient participated  Level of consciousness: awake and alert  Pain score: 0  Airway patency: patent  Nausea & Vomiting: no nausea and no vomiting  Cardiovascular status: hemodynamically stable  Respiratory status: acceptable  Hydration status: euvolemic  Multimodal analgesia pain management approach  Pain management: adequate    No notable events documented.

## 2024-05-14 NOTE — ANESTHESIA PRE PROCEDURE
Department of Anesthesiology  Preprocedure Note       Name:  Jamar Garcia   Age:  83 y.o.  :  1940                                          MRN:  [id number]         Date:  2024      Surgeon: Surgeon(s):  Honorio Wiseman MD    Procedure: Procedure(s):  ESOPHAGOGASTRODUODENOSCOPY    Medications prior to admission:   Prior to Admission medications    Medication Sig Start Date End Date Taking? Authorizing Provider   ALPRAZolam (XANAX) 0.5 MG tablet Take 1 tablet by mouth nightly as needed for Anxiety or Sleep. 17   Automatic Reconciliation, Ar   amLODIPine (NORVASC) 5 MG tablet Take 1 tablet by mouth daily 11/10/18   Automatic Reconciliation, Ar   aspirin 81 MG EC tablet Take 1 tablet by mouth daily    Automatic Reconciliation, Ar   atorvastatin (LIPITOR) 40 MG tablet TK 1 T PO  ONCE A DAY 17   Automatic Reconciliation, Ar   Calcium Carbonate-Vitamin D 600-3.125 MG-MCG TABS Take 1,200 mg by mouth    Automatic Reconciliation, Ar   clopidogrel (PLAVIX) 75 MG tablet TK 1 T PO QD 17   Automatic Reconciliation, Ar   levothyroxine (SYNTHROID) 25 MCG tablet Take 1 tablet by mouth Daily 16   Automatic Reconciliation, Ar       Current medications:    Current Facility-Administered Medications   Medication Dose Route Frequency Provider Last Rate Last Admin   • lidocaine PF 1 % injection 1 mL  1 mL IntraDERmal Once PRN EnaBeaes E, APRN - CRNA       • famotidine (PEPCID) tablet 20 mg  20 mg Oral Once EnaBeaes E, APRN - CRNA       • lactated ringers IV soln infusion   IntraVENous Continuous EnaBeaes E, APRN - CRNA       • sodium chloride flush 0.9 % injection 5-40 mL  5-40 mL IntraVENous PRN EnaBeaes E, APRN - CRNA           Allergies:  No Known Allergies    Problem List:    Patient Active Problem List   Diagnosis Code   • Dizziness R42   • Breast cancer of upper-outer quadrant of left female breast (HCC) C50.412   • Personal history of breast cancer Z85.3   • NSTEMI

## 2024-08-20 ENCOUNTER — ANESTHESIA EVENT (OUTPATIENT)
Facility: HOSPITAL | Age: 84
End: 2024-08-20
Payer: MEDICARE

## 2024-08-21 ENCOUNTER — ANESTHESIA (OUTPATIENT)
Facility: HOSPITAL | Age: 84
End: 2024-08-21
Payer: MEDICARE

## 2024-08-21 ENCOUNTER — HOSPITAL ENCOUNTER (OUTPATIENT)
Facility: HOSPITAL | Age: 84
Setting detail: OUTPATIENT SURGERY
Discharge: HOME | End: 2024-08-21
Attending: INTERNAL MEDICINE | Admitting: INTERNAL MEDICINE
Payer: MEDICARE

## 2024-08-21 VITALS
BODY MASS INDEX: 19.66 KG/M2 | DIASTOLIC BLOOD PRESSURE: 65 MMHG | OXYGEN SATURATION: 100 % | RESPIRATION RATE: 14 BRPM | HEART RATE: 77 BPM | WEIGHT: 95.7 LBS | TEMPERATURE: 97.6 F | SYSTOLIC BLOOD PRESSURE: 149 MMHG

## 2024-08-21 PROCEDURE — 3700000001 HC ADD 15 MINUTES (ANESTHESIA): Performed by: INTERNAL MEDICINE

## 2024-08-21 PROCEDURE — 2580000003 HC RX 258: Performed by: NURSE ANESTHETIST, CERTIFIED REGISTERED

## 2024-08-21 PROCEDURE — 7100000010 HC PHASE II RECOVERY - FIRST 15 MIN: Performed by: INTERNAL MEDICINE

## 2024-08-21 PROCEDURE — 2709999900 HC NON-CHARGEABLE SUPPLY: Performed by: INTERNAL MEDICINE

## 2024-08-21 PROCEDURE — 2500000003 HC RX 250 WO HCPCS: Performed by: STUDENT IN AN ORGANIZED HEALTH CARE EDUCATION/TRAINING PROGRAM

## 2024-08-21 PROCEDURE — 3600007512: Performed by: INTERNAL MEDICINE

## 2024-08-21 PROCEDURE — 7100000011 HC PHASE II RECOVERY - ADDTL 15 MIN: Performed by: INTERNAL MEDICINE

## 2024-08-21 PROCEDURE — 3700000000 HC ANESTHESIA ATTENDED CARE: Performed by: INTERNAL MEDICINE

## 2024-08-21 PROCEDURE — 3600007502: Performed by: INTERNAL MEDICINE

## 2024-08-21 PROCEDURE — 6360000002 HC RX W HCPCS: Performed by: STUDENT IN AN ORGANIZED HEALTH CARE EDUCATION/TRAINING PROGRAM

## 2024-08-21 PROCEDURE — 7100000000 HC PACU RECOVERY - FIRST 15 MIN: Performed by: INTERNAL MEDICINE

## 2024-08-21 RX ORDER — LIDOCAINE HYDROCHLORIDE 20 MG/ML
INJECTION, SOLUTION EPIDURAL; INFILTRATION; INTRACAUDAL; PERINEURAL PRN
Status: DISCONTINUED | OUTPATIENT
Start: 2024-08-21 | End: 2024-08-21 | Stop reason: SDUPTHER

## 2024-08-21 RX ORDER — LIDOCAINE HYDROCHLORIDE 10 MG/ML
1 INJECTION, SOLUTION EPIDURAL; INFILTRATION; INTRACAUDAL; PERINEURAL
Status: DISCONTINUED | OUTPATIENT
Start: 2024-08-21 | End: 2024-08-21 | Stop reason: HOSPADM

## 2024-08-21 RX ORDER — FAMOTIDINE 20 MG/1
20 TABLET, FILM COATED ORAL ONCE
Status: DISCONTINUED | OUTPATIENT
Start: 2024-08-21 | End: 2024-08-21 | Stop reason: HOSPADM

## 2024-08-21 RX ORDER — PROPOFOL 10 MG/ML
INJECTION, EMULSION INTRAVENOUS PRN
Status: DISCONTINUED | OUTPATIENT
Start: 2024-08-21 | End: 2024-08-21 | Stop reason: SDUPTHER

## 2024-08-21 RX ORDER — SODIUM CHLORIDE, SODIUM LACTATE, POTASSIUM CHLORIDE, CALCIUM CHLORIDE 600; 310; 30; 20 MG/100ML; MG/100ML; MG/100ML; MG/100ML
INJECTION, SOLUTION INTRAVENOUS CONTINUOUS
Status: DISCONTINUED | OUTPATIENT
Start: 2024-08-21 | End: 2024-08-21 | Stop reason: HOSPADM

## 2024-08-21 RX ADMIN — LIDOCAINE HYDROCHLORIDE 40 MG: 20 INJECTION, SOLUTION EPIDURAL; INFILTRATION; INTRACAUDAL; PERINEURAL at 10:06

## 2024-08-21 RX ADMIN — PROPOFOL 20 MG: 10 INJECTION, EMULSION INTRAVENOUS at 10:09

## 2024-08-21 RX ADMIN — PROPOFOL 30 MG: 10 INJECTION, EMULSION INTRAVENOUS at 10:12

## 2024-08-21 RX ADMIN — PROPOFOL 50 MG: 10 INJECTION, EMULSION INTRAVENOUS at 10:06

## 2024-08-21 RX ADMIN — SODIUM CHLORIDE, POTASSIUM CHLORIDE, SODIUM LACTATE AND CALCIUM CHLORIDE: 600; 310; 30; 20 INJECTION, SOLUTION INTRAVENOUS at 09:05

## 2024-08-21 RX ADMIN — PROPOFOL 20 MG: 10 INJECTION, EMULSION INTRAVENOUS at 10:07

## 2024-08-21 ASSESSMENT — PAIN - FUNCTIONAL ASSESSMENT
PAIN_FUNCTIONAL_ASSESSMENT: 0-10
PAIN_FUNCTIONAL_ASSESSMENT: NONE - DENIES PAIN

## 2024-08-21 NOTE — H&P
[phone]    GASTROENTEROLOGY Pre-Procedure H and P      Impression/Plan:   1. This patient is consented for a colonoscopy for anemia    Chief Complaint: anemia      HPI:  Jamar Garcia is a 84 y.o. female who is being is having a colonoscopy for anemia    PMH:   Past Medical History:   Diagnosis Date    Breast CA (HCC) 2017    left  / S/p Mastectomy    Hemochromatosis     High cholesterol     History of blood transfusion     Hypertension     SANDRA on CPAP 2010    Thyroid disease     TIA (transient ischemic attack) 2010    Damaged left retina       PSH:   Past Surgical History:   Procedure Laterality Date    COLONOSCOPY N/A 6/5/2018    COLONOSCOPY performed by Willie Olivares MD at [facility] ENDOSCOPY    EYE EXAMINATION UNDER ANESTHESIA W/ RETINAL CRYOTHERAPY AND RETINAL LASER Left 2010    HYSTERECTOMY (CERVIX STATUS UNKNOWN)  2001    Total    MASTECTOMY, SIMPLE, COMPLETE Left 02/21/2017    Dr. Escobedo    UPPER GASTROINTESTINAL ENDOSCOPY N/A 5/14/2024    ESOPHAGOGASTRODUODENOSCOPY performed by Honorio Wiseman MD at [facility] ENDOSCOPY       Social HX:   Social History     Socioeconomic History    Marital status:      Spouse name: Not on file    Number of children: Not on file    Years of education: Not on file    Highest education level: Not on file   Occupational History    Not on file   Tobacco Use    Smoking status: Former     Types: Cigarettes     Start date: 1982    Smokeless tobacco: Never   Vaping Use    Vaping status: Never Used   Substance and Sexual Activity    Alcohol use: Not Currently     Comment: Quit 2020    Drug use: Never    Sexual activity: Not on file   Other Topics Concern    Not on file   Social History Narrative    Not on file     Social Determinants of Health     Financial Resource Strain: Not on file   Food Insecurity: Not on file   Transportation Needs: Not on file   Physical Activity: Not on file   Stress: Not on file   Social Connections: Not on file   Intimate Partner Violence:

## 2024-08-21 NOTE — ANESTHESIA POSTPROCEDURE EVALUATION
Department of Anesthesiology  Postprocedure Note    Patient: Jamar Garcia  MRN: [id number]  YOB: 1940  Date of evaluation: 8/21/2024    Procedure Summary       Date: 08/21/24 Room / Location: [facility] ENDO 02 / [facility] ENDOSCOPY    Anesthesia Start: 1001 Anesthesia Stop: 1022    Procedure: COLONOSCOPY DIAGNOSTIC [NORMAL] (Abdomen) Diagnosis:       Anemia due to chronic blood loss      Rectal bleeding      (Anemia due to chronic blood loss [D50.0])      (Rectal bleeding [K62.5])    Surgeons: Honorio Wiseman MD Responsible Provider: Juan Julio MD    Anesthesia Type: General, TIVA ASA Status: 3            Anesthesia Type: General, TIVA    Sharri Phase I: Sharri Score: 10    Sharri Phase II: Sharri Score: 10    Anesthesia Post Evaluation    Patient location during evaluation: bedside  Patient participation: complete - patient participated  Level of consciousness: awake  Pain score: 0  Airway patency: patent  Nausea & Vomiting: no nausea  Cardiovascular status: hemodynamically stable  Respiratory status: acceptable  Hydration status: euvolemic  Pain management: satisfactory to patient        No notable events documented.

## 2024-08-21 NOTE — ANESTHESIA PRE PROCEDURE
Department of Anesthesiology  Preprocedure Note       Name:  Jamar Garcia   Age:  84 y.o.  :  1940                                          MRN:  [id number]         Date:  2024      Surgeon: Surgeon(s):  Honorio Wiseman MD    Procedure: Procedure(s):  COLONOSCOPY DIAGNOSTIC    Medications prior to admission:   Prior to Admission medications    Medication Sig Start Date End Date Taking? Authorizing Provider   ALPRAZolam (XANAX) 0.5 MG tablet Take 1 tablet by mouth nightly as needed for Anxiety or Sleep. 17  Yes Automatic Reconciliation, Ar   amLODIPine (NORVASC) 5 MG tablet Take 1 tablet by mouth daily 11/10/18  Yes Automatic Reconciliation, Ar   aspirin 81 MG EC tablet Take 1 tablet by mouth daily   Yes Automatic Reconciliation, Ar   atorvastatin (LIPITOR) 40 MG tablet TK 1 T PO  ONCE A DAY 17  Yes Automatic Reconciliation, Ar   Calcium Carbonate-Vitamin D 600-3.125 MG-MCG TABS Take 1,200 mg by mouth   Yes Automatic Reconciliation, Ar   levothyroxine (SYNTHROID) 25 MCG tablet Take 1 tablet by mouth Daily 16  Yes Automatic Reconciliation, Ar   clopidogrel (PLAVIX) 75 MG tablet TK 1 T PO QD 17   Automatic Reconciliation, Ar       Current medications:    Current Facility-Administered Medications   Medication Dose Route Frequency Provider Last Rate Last Admin   • lidocaine PF 1 % injection 1 mL  1 mL IntraDERmal Once PRN Lori Hendrickson APRN - CRNA       • famotidine (PEPCID) tablet 20 mg  20 mg Oral Once Lori Hendrickson APRN - CRNA       • lactated ringers IV soln infusion   IntraVENous Continuous Lori Hendrickson APRN - CRNA 125 mL/hr at 24 0905 New Bag at 24 0905       Allergies:  No Known Allergies    Problem List:    Patient Active Problem List   Diagnosis Code   • Dizziness R42   • Breast cancer of upper-outer quadrant of left female breast (HCC) C50.412   • Personal history of breast cancer Z85.3   • NSTEMI (non-ST elevated myocardial infarction) ([facility])

## 2025-02-25 ENCOUNTER — HOSPITAL ENCOUNTER (OUTPATIENT)
Facility: HOSPITAL | Age: 85
Discharge: HOME | End: 2025-02-28
Attending: INTERNAL MEDICINE
Payer: MEDICARE

## 2025-02-25 DIAGNOSIS — Z12.31 SCREENING MAMMOGRAM FOR BREAST CANCER: ICD-10-CM

## 2025-02-25 PROCEDURE — 77063 BREAST TOMOSYNTHESIS BI: CPT

## 2025-05-13 ENCOUNTER — OFFICE VISIT (OUTPATIENT)
Age: 85
End: 2025-05-13
Payer: MEDICARE

## 2025-05-13 VITALS
HEART RATE: 86 BPM | OXYGEN SATURATION: 97 % | HEIGHT: 59 IN | SYSTOLIC BLOOD PRESSURE: 122 MMHG | BODY MASS INDEX: 20.16 KG/M2 | DIASTOLIC BLOOD PRESSURE: 70 MMHG | WEIGHT: 100 LBS

## 2025-05-13 DIAGNOSIS — E78.5 DYSLIPIDEMIA: ICD-10-CM

## 2025-05-13 DIAGNOSIS — C50.412 MALIGNANT NEOPLASM OF UPPER-OUTER QUADRANT OF LEFT FEMALE BREAST, UNSPECIFIED ESTROGEN RECEPTOR STATUS (HCC): ICD-10-CM

## 2025-05-13 DIAGNOSIS — I10 HYPERTENSION, UNSPECIFIED TYPE: Primary | ICD-10-CM

## 2025-05-13 DIAGNOSIS — G45.9 TIA (TRANSIENT ISCHEMIC ATTACK): ICD-10-CM

## 2025-05-13 PROCEDURE — 1036F TOBACCO NON-USER: CPT | Performed by: INTERNAL MEDICINE

## 2025-05-13 PROCEDURE — 3074F SYST BP LT 130 MM HG: CPT | Performed by: INTERNAL MEDICINE

## 2025-05-13 PROCEDURE — 1160F RVW MEDS BY RX/DR IN RCRD: CPT | Performed by: INTERNAL MEDICINE

## 2025-05-13 PROCEDURE — 1126F AMNT PAIN NOTED NONE PRSNT: CPT | Performed by: INTERNAL MEDICINE

## 2025-05-13 PROCEDURE — 3078F DIAST BP <80 MM HG: CPT | Performed by: INTERNAL MEDICINE

## 2025-05-13 PROCEDURE — G8420 CALC BMI NORM PARAMETERS: HCPCS | Performed by: INTERNAL MEDICINE

## 2025-05-13 PROCEDURE — 93000 ELECTROCARDIOGRAM COMPLETE: CPT | Performed by: INTERNAL MEDICINE

## 2025-05-13 PROCEDURE — 99214 OFFICE O/P EST MOD 30 MIN: CPT | Performed by: INTERNAL MEDICINE

## 2025-05-13 PROCEDURE — 1123F ACP DISCUSS/DSCN MKR DOCD: CPT | Performed by: INTERNAL MEDICINE

## 2025-05-13 PROCEDURE — 1090F PRES/ABSN URINE INCON ASSESS: CPT | Performed by: INTERNAL MEDICINE

## 2025-05-13 PROCEDURE — 1159F MED LIST DOCD IN RCRD: CPT | Performed by: INTERNAL MEDICINE

## 2025-05-13 PROCEDURE — G8427 DOCREV CUR MEDS BY ELIG CLIN: HCPCS | Performed by: INTERNAL MEDICINE

## 2025-05-13 PROCEDURE — G8400 PT W/DXA NO RESULTS DOC: HCPCS | Performed by: INTERNAL MEDICINE

## 2025-05-13 ASSESSMENT — ANXIETY QUESTIONNAIRES
1. FEELING NERVOUS, ANXIOUS, OR ON EDGE: NOT AT ALL
5. BEING SO RESTLESS THAT IT IS HARD TO SIT STILL: NOT AT ALL
7. FEELING AFRAID AS IF SOMETHING AWFUL MIGHT HAPPEN: NOT AT ALL
2. NOT BEING ABLE TO STOP OR CONTROL WORRYING: NOT AT ALL
6. BECOMING EASILY ANNOYED OR IRRITABLE: NOT AT ALL
3. WORRYING TOO MUCH ABOUT DIFFERENT THINGS: NOT AT ALL
4. TROUBLE RELAXING: NOT AT ALL
GAD7 TOTAL SCORE: 0

## 2025-05-13 ASSESSMENT — PATIENT HEALTH QUESTIONNAIRE - PHQ9
1. LITTLE INTEREST OR PLEASURE IN DOING THINGS: NOT AT ALL
SUM OF ALL RESPONSES TO PHQ QUESTIONS 1-9: 0
2. FEELING DOWN, DEPRESSED OR HOPELESS: NOT AT ALL

## 2025-05-13 ASSESSMENT — ENCOUNTER SYMPTOMS
ABDOMINAL PAIN: 0
ABDOMINAL DISTENTION: 0
NAUSEA: 0
SORE THROAT: 0
VOMITING: 0
SHORTNESS OF BREATH: 0
COUGH: 0

## 2025-05-13 NOTE — PROGRESS NOTES
Jamar Garcia presents today for   Chief Complaint   Patient presents with    Follow-up     1 year       Jamar Garcia preferred language for health care discussion is english/other.    Is someone accompanying this pt? yes    Is the patient using any DME equipment during OV? no    Depression Screening:  Depression: Not at risk (5/13/2025)    PHQ-2     PHQ-2 Score: 0        Learning Assessment:  Who is the primary learner? Patient    What is the preferred language for health care of the primary learner? ENGLISH    How does the primary learner prefer to learn new concepts? DEMONSTRATION    Answered By patient    Relationship to Learner SELF           Pt currently taking Anticoagulant therapy? no    Pt currently taking Antiplatelet therapy ? no      Coordination of Care:  1. Have you been to the ER, urgent care clinic since your last visit? Hospitalized since your last visit? no    2. Have you seen or consulted any other health care providers outside of the [facility] since your last visit? Include any pap smears or colon screening. no

## 2025-05-13 NOTE — PROGRESS NOTES
05/13/25     Jamar Garcia  is a 84 y.o. female     Chief Complaint   Patient presents with    Follow-up     1 year       HPI    Patient presents for a follow-up office visit.  She was initially self-referred to establish care with a cardiologist.  She has a past medical history significant for a remote TIA back in 2009 while living in [place].  She is been maintained on dual antiplatelet therapy since that time.  More recently, she was hospitalized at [facility] in 2018 with a presumed type II MI due to hypertensive urgency.  She underwent an echocardiogram in November 2018 which showed preserved LVEF 55 to 6% without any regional wall motion abnormalities, mild concentric LVH, no significant valvular heart disease.  She also underwent a pharmacologic nuclear stress test which showed no evidence of ischemia or infarction.  Preserved EF.    Patient was last seen in our office a little over a year ago.  She states that her PCP stopped her antiplatelet therapy due to worsening anemia.  She states the workup has been in progress for this.  She has been feeling little more fatigued since the anemia diagnosed but states that it is slowly improving.  She denies any chest pain, shortness of breath or leg swelling.    Past Medical History:   Diagnosis Date    Breast CA (HCC) 2017    left  / S/p Mastectomy    Hemochromatosis     High cholesterol     History of blood transfusion     Hypertension     SANDRA on CPAP 2010    Thyroid disease     TIA (transient ischemic attack) 2010    Damaged left retina     Current Outpatient Medications   Medication Sig Dispense Refill    fluticasone-umeclidin-vilant (TRELEGY ELLIPTA) 100-62.5-25 MCG/ACT AEPB inhaler Inhale 1 puff into the lungs daily      ALPRAZolam (XANAX) 0.5 MG tablet Take 1 tablet by mouth nightly as needed for Anxiety or Sleep.      amLODIPine (NORVASC) 5 MG tablet Take 1 tablet by mouth daily      atorvastatin (LIPITOR) 40 MG tablet TK 1 T PO  ONCE A DAY      Calcium

## (undated) DEVICE — ENDOSCOPY PUMP TUBING/ CAP SET: Brand: ERBE

## (undated) DEVICE — APPLIER LIG CLP M L11IN TI STR RNG HNDL FOR 20 CLP DISP

## (undated) DEVICE — STERILE POLYISOPRENE POWDER-FREE SURGICAL GLOVES: Brand: PROTEXIS

## (undated) DEVICE — LINER SUCT CANSTR 3000CC PLAS SFT PRE ASSEMB W/OUT TBNG W/

## (undated) DEVICE — UNDERPAD INCONT W23XL36IN STD BLU POLYPR BK FLUF SFT

## (undated) DEVICE — PACK PROCEDURE SURG MAJ W/ BASIN LF

## (undated) DEVICE — 3M™ DURAPORE™ SURGICAL TAPE 2 INCHES X 10YARDS (5.0CM X 9.1M) 6ROLLS/CARTON 10CARTONS/CASE 1538-2: Brand: 3M™ DURAPORE™

## (undated) DEVICE — REM POLYHESIVE ADULT PATIENT RETURN ELECTRODE: Brand: VALLEYLAB

## (undated) DEVICE — FORCEPS BX L240CM JAW DIA2.4MM ORNG L CAP W/ NDL DISP RAD

## (undated) DEVICE — (D)STRIP SKN CLSR 0.5X4IN WHT --

## (undated) DEVICE — DRAIN SURG W10MMXL20CM SIL FULL PERF HUBLESS FLAT RADPQ

## (undated) DEVICE — SYRINGE MED 25GA 3ML L5/8IN SUBQ PLAS W/ DETACH NDL SFTY

## (undated) DEVICE — SUTURE VCRL SZ 3-0 L27IN ABSRB UD L26MM SH 1/2 CIR J416H

## (undated) DEVICE — Device

## (undated) DEVICE — SUTURE MCRYL SZ 4-0 L18IN ABSRB UD L19MM PS-2 3/8 CIR PRIM Y496G

## (undated) DEVICE — FLEX ADVANTAGE 3000CC: Brand: FLEX ADVANTAGE

## (undated) DEVICE — SUTURE ETHLN SZ 2-0 L30IN NONABSORBABLE BLK L36MM PSLX 3/8 1697H

## (undated) DEVICE — YANKAUER,SMOOTH HANDLE,HIGH CAPACITY: Brand: MEDLINE INDUSTRIES, INC.

## (undated) DEVICE — 3M™ STERI-STRIP™ COMPOUND BENZOIN TINCTURE 40 BAGS/CARTON 4 CARTONS/CASE C1544: Brand: 3M™ STERI-STRIP™

## (undated) DEVICE — SOLUTION IRRIG 1000ML H2O STRL BLT

## (undated) DEVICE — MINOR SNGL BASIN W/GOWNS I-LF: Brand: MEDLINE INDUSTRIES, INC.

## (undated) DEVICE — FLUFF AND POLYMER UNDERPAD,EXTRA HEAVY: Brand: WINGS

## (undated) DEVICE — GOWN PLASTIC FILM THMBHKS UNIV BLUE: Brand: CARDINAL HEALTH

## (undated) DEVICE — SYRINGE MED 10ML LUERLOCK TIP W/O SFTY DISP

## (undated) DEVICE — HEX-LOCKING BLADE ELECTRODE: Brand: EDGE

## (undated) DEVICE — CANNULA NSL AD TBNG L14FT STD PVC O2 CRV CONN NONFLARED NSL

## (undated) DEVICE — FLEX ADVANTAGE 1500CC: Brand: FLEX ADVANTAGE

## (undated) DEVICE — DRAPE,T,LAPARO,TRANS,STERILE: Brand: MEDLINE

## (undated) DEVICE — GAUZE,SPONGE,4"X4",16PLY,STRL,LF,10/TRAY: Brand: MEDLINE

## (undated) DEVICE — SYRINGE MED 50ML LUERSLIP TIP

## (undated) DEVICE — CATHETER SUCT TR FL TIP 14FR W/ O CTRL

## (undated) DEVICE — ABDOMINAL PAD: Brand: DERMACEA

## (undated) DEVICE — BITE BLOCK ENDOSCP UNIV AD 6 TO 9.4 MM

## (undated) DEVICE — MEDI-VAC NON-CONDUCTIVE SUCTION TUBING: Brand: CARDINAL HEALTH

## (undated) DEVICE — (D)GLOVE SURG TRIFLX 8 PWD LTX -- DISC BY MFR USE ITEM 302994

## (undated) DEVICE — INTENDED FOR TISSUE SEPARATION, AND OTHER PROCEDURES THAT REQUIRE A SHARP SURGICAL BLADE TO PUNCTURE OR CUT.: Brand: BARD-PARKER SAFETY BLADES SIZE 10, STERILE

## (undated) DEVICE — KIT CLN UP BON SECOURS MARYV

## (undated) DEVICE — AIRLIFE™ NASAL OXYGEN CANNULA CURVED, NONFLARED TIP WITH 14 FOOT (4.3 M) CRUSH-RESISTANT TUBING, OVER-THE-EAR STYLE: Brand: AIRLIFE™

## (undated) DEVICE — SYRINGE 20ML LL S/C 50

## (undated) DEVICE — SUTURE PERMA-HAND SZ 3-0 L24IN NONABSORBABLE BLK W/O NDL SA74H

## (undated) DEVICE — CANNULA ORIG TL CLR W FOAM CUSHIONS AND 14FT SUPL TB 3 CHN

## (undated) DEVICE — CATH IV SAFE STR 22GX1IN BLU -- PROTECTIV PLUS

## (undated) DEVICE — 3M™ BAIR PAWS FLEX™ WARMING GOWN, STANDARD, 20 PER CASE 81003: Brand: BAIR PAWS™

## (undated) DEVICE — SLIM BODY SKIN STAPLER: Brand: APPOSE ULC

## (undated) DEVICE — GOWN,REINFORCED,POLY,AURORA,XLARGE,STRL: Brand: MEDLINE

## (undated) DEVICE — 48" PROBE COVER W/GEL, ULTRASOUND, STERILE: Brand: SITE-RITE

## (undated) DEVICE — DRAPE TWL SURG 16X26IN BLU ORB04] ALLCARE INC]

## (undated) DEVICE — THREE-QUARTER SHEET: Brand: CONVERTORS

## (undated) DEVICE — BLADE ASSEMB CLP HAIR FINE --

## (undated) DEVICE — BASIN EMSIS 16OZ GRAPHITE PLAS KID SHP MOLD GRAD FOR ORAL

## (undated) DEVICE — SPONGE LAP 18X18IN STRL -- 5/PK

## (undated) DEVICE — GAUZE SPONGES,16 PLY: Brand: CURITY